# Patient Record
Sex: FEMALE | Race: BLACK OR AFRICAN AMERICAN | NOT HISPANIC OR LATINO | Employment: UNEMPLOYED | RURAL
[De-identification: names, ages, dates, MRNs, and addresses within clinical notes are randomized per-mention and may not be internally consistent; named-entity substitution may affect disease eponyms.]

---

## 2017-06-14 ENCOUNTER — HISTORICAL (OUTPATIENT)
Dept: ADMINISTRATIVE | Facility: HOSPITAL | Age: 57
End: 2017-06-14

## 2017-06-15 LAB
LAB AP CLINICAL INFORMATION: NORMAL
LAB AP DIAGNOSIS - HISTORICAL: NORMAL
LAB AP GROSS PATHOLOGY - HISTORICAL: NORMAL
LAB AP SPECIMEN SUBMITTED - HISTORICAL: NORMAL

## 2017-08-07 ENCOUNTER — HISTORICAL (OUTPATIENT)
Dept: ADMINISTRATIVE | Facility: HOSPITAL | Age: 57
End: 2017-08-07

## 2017-08-09 LAB
LAB AP COMMENTS: NORMAL
LAB AP GENERAL CAT - HISTORICAL: NORMAL
LAB AP INTERPRETATION/RESULT - HISTORICAL: NEGATIVE
LAB AP SPECIMEN ADEQUACY - HISTORICAL: NORMAL
LAB AP SPECIMEN SUBMITTED - HISTORICAL: NORMAL

## 2018-04-25 ENCOUNTER — HISTORICAL (OUTPATIENT)
Dept: ADMINISTRATIVE | Facility: HOSPITAL | Age: 58
End: 2018-04-25

## 2020-11-17 ENCOUNTER — HISTORICAL (OUTPATIENT)
Dept: ADMINISTRATIVE | Facility: HOSPITAL | Age: 60
End: 2020-11-17

## 2020-11-24 ENCOUNTER — HISTORICAL (OUTPATIENT)
Dept: ADMINISTRATIVE | Facility: HOSPITAL | Age: 60
End: 2020-11-24

## 2020-12-02 ENCOUNTER — HISTORICAL (OUTPATIENT)
Dept: ADMINISTRATIVE | Facility: HOSPITAL | Age: 60
End: 2020-12-02

## 2020-12-02 LAB — GLUCOSE SERPL-MCNC: 92 MG/DL (ref 70–105)

## 2020-12-06 ENCOUNTER — HISTORICAL (OUTPATIENT)
Dept: ADMINISTRATIVE | Facility: HOSPITAL | Age: 60
End: 2020-12-06

## 2020-12-06 LAB
ANION GAP SERPL CALCULATED.3IONS-SCNC: 14 MMOL/L
BASOPHILS # BLD AUTO: 0.07 X10E3/UL (ref 0–0.2)
BASOPHILS NFR BLD AUTO: 0.7 % (ref 0–1)
BILIRUB UR QL STRIP: NEGATIVE MG/DL
BUN SERPL-MCNC: 21 MG/DL (ref 7–18)
CALCIUM SERPL-MCNC: 9.4 MG/DL (ref 8.5–10.1)
CHLORIDE SERPL-SCNC: 102 MMOL/L (ref 98–107)
CLARITY UR: CLEAR
CO2 SERPL-SCNC: 23 MMOL/L (ref 21–32)
COLOR UR: ABNORMAL
CREAT SERPL-MCNC: 0.98 MG/DL (ref 0.55–1.02)
CRP SERPL-MCNC: 0.61 MG/DL (ref 0–0.8)
EOSINOPHIL # BLD AUTO: 0.07 X10E3/UL (ref 0–0.5)
EOSINOPHIL NFR BLD AUTO: 0.7 % (ref 1–4)
ERYTHROCYTE [DISTWIDTH] IN BLOOD BY AUTOMATED COUNT: 12.9 % (ref 11.5–14.5)
ERYTHROCYTE [SEDIMENTATION RATE] IN BLOOD BY WESTERGREN METHOD: 34 MM/HR (ref 0–30)
GLUCOSE SERPL-MCNC: 80 MG/DL (ref 74–106)
GLUCOSE UR STRIP-MCNC: NEGATIVE MG/DL
HCT VFR BLD AUTO: 40.6 % (ref 38–47)
HGB BLD-MCNC: 12.6 G/DL (ref 12–16)
IMM GRANULOCYTES # BLD AUTO: 0.04 X10E3/UL (ref 0–0.04)
IMM GRANULOCYTES NFR BLD: 0.4 % (ref 0–0.4)
KETONES UR STRIP-SCNC: NEGATIVE MG/DL
LEUKOCYTE ESTERASE UR QL STRIP: NEGATIVE LEU/UL
LYMPHOCYTES # BLD AUTO: 2.63 X10E3/UL (ref 1–4.8)
LYMPHOCYTES NFR BLD AUTO: 25.2 % (ref 27–41)
MCH RBC QN AUTO: 30.1 PG (ref 27–31)
MCHC RBC AUTO-ENTMCNC: 31 G/DL (ref 32–36)
MCV RBC AUTO: 96.9 FL (ref 80–96)
MONOCYTES # BLD AUTO: 0.59 X10E3/UL (ref 0–0.8)
MONOCYTES NFR BLD AUTO: 5.7 % (ref 2–6)
MPC BLD CALC-MCNC: 10.9 FL (ref 9.4–12.4)
NEUTROPHILS # BLD AUTO: 7.04 X10E3/UL (ref 1.8–7.7)
NEUTROPHILS NFR BLD AUTO: 67.3 % (ref 53–65)
NITRITE UR QL STRIP: NEGATIVE
NRBC # BLD AUTO: 0 X10E3/UL (ref 0–0)
NRBC, AUTO (.00): 0 /100 (ref 0–0)
PH UR STRIP: 5.5 PH UNITS (ref 5–8)
PLATELET # BLD AUTO: 197 X10E3/UL (ref 150–400)
POTASSIUM SERPL-SCNC: 4.2 MMOL/L (ref 3.5–5.1)
PROT UR QL STRIP: NEGATIVE MG/DL
RBC # BLD AUTO: 4.19 X10E6/UL (ref 4.2–5.4)
RBC # UR STRIP: NEGATIVE ERY/UL
SODIUM SERPL-SCNC: 135 MMOL/L (ref 136–145)
SP GR UR STRIP: 1.02 (ref 1–1.03)
UROBILINOGEN UR STRIP-ACNC: 0.2 EU/DL
WBC # BLD AUTO: 10.44 X10E3/UL (ref 4.5–11)

## 2020-12-07 ENCOUNTER — HISTORICAL (OUTPATIENT)
Dept: ADMINISTRATIVE | Facility: HOSPITAL | Age: 60
End: 2020-12-07

## 2020-12-07 LAB
ANION GAP SERPL CALCULATED.3IONS-SCNC: 8 MMOL/L
ANION GAP SERPL CALCULATED.3IONS-SCNC: 9 MMOL/L
BASOPHILS # BLD AUTO: 0.06 X10E3/UL (ref 0–0.2)
BASOPHILS # BLD AUTO: 0.07 X10E3/UL (ref 0–0.2)
BASOPHILS NFR BLD AUTO: 0.5 % (ref 0–1)
BASOPHILS NFR BLD AUTO: 0.6 % (ref 0–1)
BUN SERPL-MCNC: 22 MG/DL (ref 7–18)
BUN SERPL-MCNC: 23 MG/DL (ref 7–18)
CALCIUM SERPL-MCNC: 9.1 MG/DL (ref 8.5–10.1)
CALCIUM SERPL-MCNC: 9.3 MG/DL (ref 8.5–10.1)
CHLORIDE SERPL-SCNC: 102 MMOL/L (ref 98–107)
CHLORIDE SERPL-SCNC: 102 MMOL/L (ref 98–107)
CO2 SERPL-SCNC: 27 MMOL/L (ref 21–32)
CO2 SERPL-SCNC: 28 MMOL/L (ref 21–32)
CREAT SERPL-MCNC: 0.8 MG/DL (ref 0.55–1.02)
CREAT SERPL-MCNC: 0.82 MG/DL (ref 0.55–1.02)
EOSINOPHIL # BLD AUTO: 0.03 X10E3/UL (ref 0–0.5)
EOSINOPHIL # BLD AUTO: 0.04 X10E3/UL (ref 0–0.5)
EOSINOPHIL NFR BLD AUTO: 0.3 % (ref 1–4)
EOSINOPHIL NFR BLD AUTO: 0.3 % (ref 1–4)
ERYTHROCYTE [DISTWIDTH] IN BLOOD BY AUTOMATED COUNT: 12.7 % (ref 11.5–14.5)
ERYTHROCYTE [DISTWIDTH] IN BLOOD BY AUTOMATED COUNT: 12.9 % (ref 11.5–14.5)
GLUCOSE SERPL-MCNC: 207 MG/DL (ref 70–105)
GLUCOSE SERPL-MCNC: 79 MG/DL (ref 74–106)
GLUCOSE SERPL-MCNC: 86 MG/DL (ref 74–106)
HCT VFR BLD AUTO: 34.2 % (ref 38–47)
HCT VFR BLD AUTO: 34.3 % (ref 38–47)
HGB BLD-MCNC: 10.8 G/DL (ref 12–16)
HGB BLD-MCNC: 10.9 G/DL (ref 12–16)
IMM GRANULOCYTES # BLD AUTO: 0.05 X10E3/UL (ref 0–0.04)
IMM GRANULOCYTES # BLD AUTO: 0.06 X10E3/UL (ref 0–0.04)
IMM GRANULOCYTES NFR BLD: 0.4 % (ref 0–0.4)
IMM GRANULOCYTES NFR BLD: 0.5 % (ref 0–0.4)
LYMPHOCYTES # BLD AUTO: 3.19 X10E3/UL (ref 1–4.8)
LYMPHOCYTES # BLD AUTO: 3.35 X10E3/UL (ref 1–4.8)
LYMPHOCYTES NFR BLD AUTO: 27.4 % (ref 27–41)
LYMPHOCYTES NFR BLD AUTO: 28.1 % (ref 27–41)
MCH RBC QN AUTO: 29.8 PG (ref 27–31)
MCH RBC QN AUTO: 30 PG (ref 27–31)
MCHC RBC AUTO-ENTMCNC: 31.5 G/DL (ref 32–36)
MCHC RBC AUTO-ENTMCNC: 31.9 G/DL (ref 32–36)
MCV RBC AUTO: 93.4 FL (ref 80–96)
MCV RBC AUTO: 95.3 FL (ref 80–96)
MONOCYTES # BLD AUTO: 0.58 X10E3/UL (ref 0–0.8)
MONOCYTES # BLD AUTO: 0.64 X10E3/UL (ref 0–0.8)
MONOCYTES NFR BLD AUTO: 5 % (ref 2–6)
MONOCYTES NFR BLD AUTO: 5.4 % (ref 2–6)
MPC BLD CALC-MCNC: 10.8 FL (ref 9.4–12.4)
MPC BLD CALC-MCNC: 11.1 FL (ref 9.4–12.4)
NEUTROPHILS # BLD AUTO: 7.72 X10E3/UL (ref 1.8–7.7)
NEUTROPHILS # BLD AUTO: 7.76 X10E3/UL (ref 1.8–7.7)
NEUTROPHILS NFR BLD AUTO: 65.1 % (ref 53–65)
NEUTROPHILS NFR BLD AUTO: 66.4 % (ref 53–65)
NRBC # BLD AUTO: 0 X10E3/UL (ref 0–0)
NRBC # BLD AUTO: 0 X10E3/UL (ref 0–0)
NRBC, AUTO (.00): 0 /100 (ref 0–0)
NRBC, AUTO (.00): 0 /100 (ref 0–0)
PLATELET # BLD AUTO: 113 X10E3/UL (ref 150–400)
PLATELET # BLD AUTO: 188 X10E3/UL (ref 150–400)
PLATELET MORPHOLOGY: ABNORMAL
POTASSIUM SERPL-SCNC: 3.4 MMOL/L (ref 3.5–5.1)
POTASSIUM SERPL-SCNC: 3.5 MMOL/L (ref 3.5–5.1)
RBC # BLD AUTO: 3.6 X10E6/UL (ref 4.2–5.4)
RBC # BLD AUTO: 3.66 X10E6/UL (ref 4.2–5.4)
RBC MORPH BLD: NORMAL
SODIUM SERPL-SCNC: 134 MMOL/L (ref 136–145)
SODIUM SERPL-SCNC: 135 MMOL/L (ref 136–145)
TROPONIN I SERPL-MCNC: 0.02 NG/ML (ref 0–0.06)
TROPONIN I SERPL-MCNC: 0.02 NG/ML (ref 0–0.06)
WBC # BLD AUTO: 11.63 X10E3/UL (ref 4.5–11)
WBC # BLD AUTO: 11.92 X10E3/UL (ref 4.5–11)

## 2020-12-08 ENCOUNTER — HISTORICAL (OUTPATIENT)
Dept: ADMINISTRATIVE | Facility: HOSPITAL | Age: 60
End: 2020-12-08

## 2020-12-08 LAB
ANION GAP SERPL CALCULATED.3IONS-SCNC: 8 MMOL/L
BASOPHILS # BLD AUTO: 0.08 X10E3/UL (ref 0–0.2)
BASOPHILS NFR BLD AUTO: 0.7 % (ref 0–1)
BILIRUB UR QL STRIP: NEGATIVE MG/DL
BUN SERPL-MCNC: 25 MG/DL (ref 7–18)
CALCIUM SERPL-MCNC: 8.6 MG/DL (ref 8.5–10.1)
CHLORIDE SERPL-SCNC: 104 MMOL/L (ref 98–107)
CLARITY UR: CLEAR
CO2 SERPL-SCNC: 28 MMOL/L (ref 21–32)
COLOR UR: ABNORMAL
CREAT SERPL-MCNC: 0.89 MG/DL (ref 0.55–1.02)
EOSINOPHIL # BLD AUTO: 0.08 X10E3/UL (ref 0–0.5)
EOSINOPHIL NFR BLD AUTO: 0.7 % (ref 1–4)
ERYTHROCYTE [DISTWIDTH] IN BLOOD BY AUTOMATED COUNT: 12.8 % (ref 11.5–14.5)
GLUCOSE SERPL-MCNC: 111 MG/DL (ref 70–105)
GLUCOSE SERPL-MCNC: 156 MG/DL (ref 70–105)
GLUCOSE SERPL-MCNC: 89 MG/DL (ref 70–105)
GLUCOSE SERPL-MCNC: 89 MG/DL (ref 74–106)
GLUCOSE SERPL-MCNC: 97 MG/DL (ref 70–105)
GLUCOSE UR STRIP-MCNC: NEGATIVE MG/DL
HCT VFR BLD AUTO: 33.3 % (ref 38–47)
HGB BLD-MCNC: 10.3 G/DL (ref 12–16)
IMM GRANULOCYTES # BLD AUTO: 0.05 X10E3/UL (ref 0–0.04)
IMM GRANULOCYTES NFR BLD: 0.5 % (ref 0–0.4)
KETONES UR STRIP-SCNC: NEGATIVE MG/DL
LEUKOCYTE ESTERASE UR QL STRIP: NEGATIVE LEU/UL
LYMPHOCYTES # BLD AUTO: 3.26 X10E3/UL (ref 1–4.8)
LYMPHOCYTES NFR BLD AUTO: 30.5 % (ref 27–41)
MCH RBC QN AUTO: 29.5 PG (ref 27–31)
MCHC RBC AUTO-ENTMCNC: 30.9 G/DL (ref 32–36)
MCV RBC AUTO: 95.4 FL (ref 80–96)
MONOCYTES # BLD AUTO: 0.59 X10E3/UL (ref 0–0.8)
MONOCYTES NFR BLD AUTO: 5.5 % (ref 2–6)
MPC BLD CALC-MCNC: 11.3 FL (ref 9.4–12.4)
NEUTROPHILS # BLD AUTO: 6.64 X10E3/UL (ref 1.8–7.7)
NEUTROPHILS NFR BLD AUTO: 62.1 % (ref 53–65)
NITRITE UR QL STRIP: NEGATIVE
NRBC # BLD AUTO: 0 X10E3/UL (ref 0–0)
NRBC, AUTO (.00): 0 /100 (ref 0–0)
NT-PROBNP SERPL-MCNC: 238 PG/ML (ref 1–125)
PH UR STRIP: 5.5 PH UNITS (ref 5–8)
PLATELET # BLD AUTO: 193 X10E3/UL (ref 150–400)
POTASSIUM SERPL-SCNC: 4.2 MMOL/L (ref 3.5–5.1)
PROT UR QL STRIP: NEGATIVE MG/DL
RBC # BLD AUTO: 3.49 X10E6/UL (ref 4.2–5.4)
RBC # UR STRIP: NEGATIVE ERY/UL
SODIUM SERPL-SCNC: 136 MMOL/L (ref 136–145)
SP GR UR STRIP: >=1.03 (ref 1–1.03)
TROPONIN I SERPL-MCNC: 0.02 NG/ML (ref 0–0.06)
UROBILINOGEN UR STRIP-ACNC: 0.2 EU/DL
WBC # BLD AUTO: 10.7 X10E3/UL (ref 4.5–11)

## 2020-12-09 ENCOUNTER — HISTORICAL (OUTPATIENT)
Dept: ADMINISTRATIVE | Facility: HOSPITAL | Age: 60
End: 2020-12-09

## 2020-12-09 LAB
ANION GAP SERPL CALCULATED.3IONS-SCNC: 14 MMOL/L
BASOPHILS # BLD AUTO: 0.02 X10E3/UL (ref 0–0.2)
BASOPHILS NFR BLD AUTO: 0.2 % (ref 0–1)
BUN SERPL-MCNC: 26 MG/DL (ref 7–18)
CALCIUM SERPL-MCNC: 9.5 MG/DL (ref 8.5–10.1)
CHLORIDE SERPL-SCNC: 99 MMOL/L (ref 98–107)
CO2 SERPL-SCNC: 27 MMOL/L (ref 21–32)
CREAT SERPL-MCNC: 1.07 MG/DL (ref 0.55–1.02)
EOSINOPHIL # BLD AUTO: 0.01 X10E3/UL (ref 0–0.5)
EOSINOPHIL NFR BLD AUTO: 0.1 % (ref 1–4)
ERYTHROCYTE [DISTWIDTH] IN BLOOD BY AUTOMATED COUNT: 12.6 % (ref 11.5–14.5)
GLUCOSE SERPL-MCNC: 151 MG/DL (ref 74–106)
GLUCOSE SERPL-MCNC: 165 MG/DL (ref 70–105)
GLUCOSE SERPL-MCNC: 218 MG/DL (ref 70–105)
HCT VFR BLD AUTO: 36.5 % (ref 38–47)
HGB BLD-MCNC: 11.8 G/DL (ref 12–16)
IMM GRANULOCYTES # BLD AUTO: 0.06 X10E3/UL (ref 0–0.04)
IMM GRANULOCYTES NFR BLD: 0.6 % (ref 0–0.4)
INR BLD: 1.08 (ref 0–3.3)
LYMPHOCYTES # BLD AUTO: 1.01 X10E3/UL (ref 1–4.8)
LYMPHOCYTES NFR BLD AUTO: 10 % (ref 27–41)
MCH RBC QN AUTO: 30 PG (ref 27–31)
MCHC RBC AUTO-ENTMCNC: 32.3 G/DL (ref 32–36)
MCV RBC AUTO: 92.9 FL (ref 80–96)
MONOCYTES # BLD AUTO: 0.19 X10E3/UL (ref 0–0.8)
MONOCYTES NFR BLD AUTO: 1.9 % (ref 2–6)
MPC BLD CALC-MCNC: 10.8 FL (ref 9.4–12.4)
NEUTROPHILS # BLD AUTO: 8.76 X10E3/UL (ref 1.8–7.7)
NEUTROPHILS NFR BLD AUTO: 87.2 % (ref 53–65)
NRBC # BLD AUTO: 0 X10E3/UL (ref 0–0)
NRBC, AUTO (.00): 0 /100 (ref 0–0)
PLATELET # BLD AUTO: 194 X10E3/UL (ref 150–400)
POTASSIUM SERPL-SCNC: 4.7 MMOL/L (ref 3.5–5.1)
PROTHROMBIN TIME: 13.5 SECONDS (ref 11.7–14.7)
RBC # BLD AUTO: 3.93 X10E6/UL (ref 4.2–5.4)
SODIUM SERPL-SCNC: 135 MMOL/L (ref 136–145)
WBC # BLD AUTO: 10.05 X10E3/UL (ref 4.5–11)

## 2021-01-20 ENCOUNTER — HISTORICAL (OUTPATIENT)
Dept: ADMINISTRATIVE | Facility: HOSPITAL | Age: 61
End: 2021-01-20

## 2021-01-21 LAB
DSDNA IGG SERPL IA-ACNC: 104 IU (ref 0–24)
DSDNA IGG SERPL IA-ACNC: POSITIVE [IU]/ML
ENA AB SER QL IA: 19 EUS (ref 0–19.9)
ENA AB SER QL IA: NEGATIVE

## 2021-01-22 LAB — TTG IGA SER IA-ACNC: <1.2 U/ML

## 2021-01-25 ENCOUNTER — HISTORICAL (OUTPATIENT)
Dept: ADMINISTRATIVE | Facility: HOSPITAL | Age: 61
End: 2021-01-25

## 2021-01-25 LAB
ALBUMIN SERPL BCP-MCNC: 4 G/DL (ref 3.5–5)
ALP SERPL-CCNC: 120 U/L (ref 46–118)
ALT SERPL W P-5'-P-CCNC: 72 U/L (ref 13–56)
ANION GAP SERPL CALCULATED.3IONS-SCNC: 11 MMOL/L
APTT PPP: 39.9 SECONDS (ref 25.2–37.3)
AST SERPL W P-5'-P-CCNC: 34 U/L (ref 15–37)
BASOPHILS # BLD AUTO: 0.1 X10E3/UL (ref 0–0.2)
BASOPHILS NFR BLD AUTO: 1 % (ref 0–1)
BILIRUB DIRECT SERPL-MCNC: 0.1 MG/DL (ref 0–0.2)
BILIRUB SERPL-MCNC: 0.3 MG/DL (ref 0–1.2)
BILIRUB UR QL STRIP: NEGATIVE MG/DL
BUN SERPL-MCNC: 19 MG/DL (ref 7–18)
CALCIUM SERPL-MCNC: 9.1 MG/DL (ref 8.5–10.1)
CHLORIDE SERPL-SCNC: 103 MMOL/L (ref 98–107)
CLARITY UR: CLEAR
CO2 SERPL-SCNC: 31 MMOL/L (ref 21–32)
COLOR UR: ABNORMAL
CREAT SERPL-MCNC: 0.93 MG/DL (ref 0.55–1.02)
EOSINOPHIL # BLD AUTO: 0.12 X10E3/UL (ref 0–0.5)
EOSINOPHIL NFR BLD AUTO: 1.2 % (ref 1–4)
ERYTHROCYTE [DISTWIDTH] IN BLOOD BY AUTOMATED COUNT: 13.8 % (ref 11.5–14.5)
ERYTHROCYTE [SEDIMENTATION RATE] IN BLOOD BY WESTERGREN METHOD: 45 MM/HR (ref 0–30)
GLUCOSE SERPL-MCNC: 108 MG/DL (ref 70–105)
GLUCOSE SERPL-MCNC: 120 MG/DL (ref 74–106)
GLUCOSE SERPL-MCNC: 88 MG/DL (ref 70–105)
GLUCOSE SERPL-MCNC: 91 MG/DL (ref 70–105)
GLUCOSE UR STRIP-MCNC: NEGATIVE MG/DL
HCT VFR BLD AUTO: 37.1 % (ref 38–47)
HGB BLD-MCNC: 11.4 G/DL (ref 12–16)
IMM GRANULOCYTES # BLD AUTO: 0.06 X10E3/UL (ref 0–0.04)
IMM GRANULOCYTES NFR BLD: 0.6 % (ref 0–0.4)
INR BLD: 2.08 (ref 0–3.3)
KETONES UR STRIP-SCNC: NEGATIVE MG/DL
LEUKOCYTE ESTERASE UR QL STRIP: NEGATIVE LEU/UL
LYMPHOCYTES # BLD AUTO: 2.49 X10E3/UL (ref 1–4.8)
LYMPHOCYTES NFR BLD AUTO: 25 % (ref 27–41)
MCH RBC QN AUTO: 30.1 PG (ref 27–31)
MCHC RBC AUTO-ENTMCNC: 30.7 G/DL (ref 32–36)
MCV RBC AUTO: 97.9 FL (ref 80–96)
MONOCYTES # BLD AUTO: 0.52 X10E3/UL (ref 0–0.8)
MONOCYTES NFR BLD AUTO: 5.2 % (ref 2–6)
MPC BLD CALC-MCNC: 10.4 FL (ref 9.4–12.4)
NEUTROPHILS # BLD AUTO: 6.68 X10E3/UL (ref 1.8–7.7)
NEUTROPHILS NFR BLD AUTO: 67 % (ref 53–65)
NITRITE UR QL STRIP: NEGATIVE
NRBC # BLD AUTO: 0 X10E3/UL (ref 0–0)
NRBC, AUTO (.00): 0 /100 (ref 0–0)
PH UR STRIP: 6 PH UNITS (ref 5–8)
PLATELET # BLD AUTO: 213 X10E3/UL (ref 150–400)
POTASSIUM SERPL-SCNC: 4 MMOL/L (ref 3.5–5.1)
PROT SERPL-MCNC: 8.8 G/DL (ref 6.4–8.2)
PROT UR QL STRIP: NEGATIVE MG/DL
PROTHROMBIN TIME: 22.2 SECONDS (ref 11.7–14.7)
RBC # BLD AUTO: 3.79 X10E6/UL (ref 4.2–5.4)
RBC # UR STRIP: NEGATIVE ERY/UL
SODIUM SERPL-SCNC: 141 MMOL/L (ref 136–145)
SP GR UR STRIP: 1.01 (ref 1–1.03)
UROBILINOGEN UR STRIP-ACNC: 0.2 EU/DL
WBC # BLD AUTO: 9.97 X10E3/UL (ref 4.5–11)

## 2021-01-26 ENCOUNTER — HISTORICAL (OUTPATIENT)
Dept: ADMINISTRATIVE | Facility: HOSPITAL | Age: 61
End: 2021-01-26

## 2021-01-26 LAB
ANION GAP SERPL CALCULATED.3IONS-SCNC: 14 MMOL/L
APTT PPP: 63.4 SECONDS (ref 25.2–37.3)
BASOPHILS # BLD AUTO: 0.08 X10E3/UL (ref 0–0.2)
BASOPHILS NFR BLD AUTO: 0.9 % (ref 0–1)
BUN SERPL-MCNC: 22 MG/DL (ref 7–18)
CALCIUM SERPL-MCNC: 9.1 MG/DL (ref 8.5–10.1)
CHLORIDE SERPL-SCNC: 105 MMOL/L (ref 98–107)
CO2 SERPL-SCNC: 27 MMOL/L (ref 21–32)
CREAT SERPL-MCNC: 0.77 MG/DL (ref 0.55–1.02)
EOSINOPHIL # BLD AUTO: 0.1 X10E3/UL (ref 0–0.5)
EOSINOPHIL NFR BLD AUTO: 1.2 % (ref 1–4)
ERYTHROCYTE [DISTWIDTH] IN BLOOD BY AUTOMATED COUNT: 14.1 % (ref 11.5–14.5)
ERYTHROCYTE [SEDIMENTATION RATE] IN BLOOD BY WESTERGREN METHOD: 64 MM/HR (ref 0–30)
FERRITIN SERPL-MCNC: 290 NG/ML (ref 8–252)
GLUCOSE SERPL-MCNC: 152 MG/DL (ref 70–105)
GLUCOSE SERPL-MCNC: 175 MG/DL (ref 70–105)
GLUCOSE SERPL-MCNC: 78 MG/DL (ref 70–105)
GLUCOSE SERPL-MCNC: 88 MG/DL (ref 74–106)
HCT VFR BLD AUTO: 33.8 % (ref 38–47)
HGB BLD-MCNC: 10.4 G/DL (ref 12–16)
IMM GRANULOCYTES # BLD AUTO: 0.06 X10E3/UL (ref 0–0.04)
IMM GRANULOCYTES NFR BLD: 0.7 % (ref 0–0.4)
INR BLD: 1.75 (ref 0–3.3)
IRON SATN MFR SERPL: 22 % (ref 14–50)
IRON SERPL-MCNC: 62 UG/DL (ref 50–170)
LYMPHOCYTES # BLD AUTO: 2.67 X10E3/UL (ref 1–4.8)
LYMPHOCYTES NFR BLD AUTO: 31.2 % (ref 27–41)
MCH RBC QN AUTO: 30.2 PG (ref 27–31)
MCHC RBC AUTO-ENTMCNC: 30.8 G/DL (ref 32–36)
MCV RBC AUTO: 98.3 FL (ref 80–96)
MONOCYTES # BLD AUTO: 0.54 X10E3/UL (ref 0–0.8)
MONOCYTES NFR BLD AUTO: 6.3 % (ref 2–6)
MPC BLD CALC-MCNC: 10.9 FL (ref 9.4–12.4)
NEUTROPHILS # BLD AUTO: 5.11 X10E3/UL (ref 1.8–7.7)
NEUTROPHILS NFR BLD AUTO: 59.7 % (ref 53–65)
NRBC # BLD AUTO: 0 X10E3/UL (ref 0–0)
NRBC, AUTO (.00): 0 /100 (ref 0–0)
PLATELET # BLD AUTO: 211 X10E3/UL (ref 150–400)
POTASSIUM SERPL-SCNC: 4.5 MMOL/L (ref 3.5–5.1)
PROTHROMBIN TIME: 19.5 SECONDS (ref 11.7–14.7)
RBC # BLD AUTO: 3.44 X10E6/UL (ref 4.2–5.4)
SODIUM SERPL-SCNC: 141 MMOL/L (ref 136–145)
TIBC SERPL-MCNC: 284 UG/DL (ref 250–450)
WBC # BLD AUTO: 8.56 X10E3/UL (ref 4.5–11)

## 2021-01-27 ENCOUNTER — HISTORICAL (OUTPATIENT)
Dept: ADMINISTRATIVE | Facility: HOSPITAL | Age: 61
End: 2021-01-27

## 2021-01-27 LAB
ANION GAP SERPL CALCULATED.3IONS-SCNC: 14 MMOL/L
ANION GAP SERPL CALCULATED.3IONS-SCNC: 14 MMOL/L
APTT PPP: 33 SECONDS (ref 25.2–37.3)
BASOPHILS # BLD AUTO: 0.02 X10E3/UL (ref 0–0.2)
BASOPHILS NFR BLD AUTO: 0.2 % (ref 0–1)
BUN SERPL-MCNC: 20 MG/DL (ref 7–18)
BUN SERPL-MCNC: 20 MG/DL (ref 7–18)
CALCIUM SERPL-MCNC: 9 MG/DL (ref 8.5–10.1)
CALCIUM SERPL-MCNC: 9.2 MG/DL (ref 8.5–10.1)
CHLORIDE SERPL-SCNC: 104 MMOL/L (ref 98–107)
CHLORIDE SERPL-SCNC: 105 MMOL/L (ref 98–107)
CO2 SERPL-SCNC: 24 MMOL/L (ref 21–32)
CO2 SERPL-SCNC: 25 MMOL/L (ref 21–32)
CREAT SERPL-MCNC: 0.74 MG/DL (ref 0.55–1.02)
CREAT SERPL-MCNC: 0.88 MG/DL (ref 0.55–1.02)
EOSINOPHIL # BLD AUTO: 0.01 X10E3/UL (ref 0–0.5)
EOSINOPHIL NFR BLD AUTO: 0.1 % (ref 1–4)
ERYTHROCYTE [DISTWIDTH] IN BLOOD BY AUTOMATED COUNT: 13.6 % (ref 11.5–14.5)
GLUCOSE SERPL-MCNC: 112 MG/DL (ref 70–105)
GLUCOSE SERPL-MCNC: 141 MG/DL (ref 70–105)
GLUCOSE SERPL-MCNC: 170 MG/DL (ref 70–105)
GLUCOSE SERPL-MCNC: 185 MG/DL (ref 74–106)
GLUCOSE SERPL-MCNC: 49 MG/DL (ref 70–105)
GLUCOSE SERPL-MCNC: 57 MG/DL (ref 70–105)
GLUCOSE SERPL-MCNC: 67 MG/DL (ref 70–105)
GLUCOSE SERPL-MCNC: 69 MG/DL (ref 74–106)
GLUCOSE SERPL-MCNC: 78 MG/DL (ref 70–105)
GLUCOSE SERPL-MCNC: 85 MG/DL (ref 70–105)
HCT VFR BLD AUTO: 33.7 % (ref 38–47)
HGB BLD-MCNC: 10.5 G/DL (ref 12–16)
IMM GRANULOCYTES # BLD AUTO: 0.08 X10E3/UL (ref 0–0.04)
IMM GRANULOCYTES NFR BLD: 0.8 % (ref 0–0.4)
INR BLD: 1.15 (ref 0–3.3)
LYMPHOCYTES # BLD AUTO: 1.6 X10E3/UL (ref 1–4.8)
LYMPHOCYTES NFR BLD AUTO: 15.2 % (ref 27–41)
MCH RBC QN AUTO: 29.7 PG (ref 27–31)
MCHC RBC AUTO-ENTMCNC: 31.2 G/DL (ref 32–36)
MCV RBC AUTO: 95.2 FL (ref 80–96)
MONOCYTES # BLD AUTO: 0.48 X10E3/UL (ref 0–0.8)
MONOCYTES NFR BLD AUTO: 4.6 % (ref 2–6)
MPC BLD CALC-MCNC: 11.1 FL (ref 9.4–12.4)
NEUTROPHILS # BLD AUTO: 8.31 X10E3/UL (ref 1.8–7.7)
NEUTROPHILS NFR BLD AUTO: 79.1 % (ref 53–65)
NRBC # BLD AUTO: 0 X10E3/UL (ref 0–0)
NRBC, AUTO (.00): 0 /100 (ref 0–0)
PLATELET # BLD AUTO: 152 X10E3/UL (ref 150–400)
POTASSIUM SERPL-SCNC: 4 MMOL/L (ref 3.5–5.1)
POTASSIUM SERPL-SCNC: 4.8 MMOL/L (ref 3.5–5.1)
PROTHROMBIN TIME: 14.1 SECONDS (ref 11.7–14.7)
RBC # BLD AUTO: 3.54 X10E6/UL (ref 4.2–5.4)
SODIUM SERPL-SCNC: 138 MMOL/L (ref 136–145)
SODIUM SERPL-SCNC: 139 MMOL/L (ref 136–145)
WBC # BLD AUTO: 10.5 X10E3/UL (ref 4.5–11)

## 2021-01-28 ENCOUNTER — HISTORICAL (OUTPATIENT)
Dept: ADMINISTRATIVE | Facility: HOSPITAL | Age: 61
End: 2021-01-28

## 2021-01-28 LAB
ANION GAP SERPL CALCULATED.3IONS-SCNC: 12 MMOL/L
APTT PPP: 37.1 SECONDS (ref 25.2–37.3)
BASOPHILS # BLD AUTO: 0.05 X10E3/UL (ref 0–0.2)
BASOPHILS NFR BLD AUTO: 0.5 % (ref 0–1)
BUN SERPL-MCNC: 17 MG/DL (ref 7–18)
CALCIUM SERPL-MCNC: 8.1 MG/DL (ref 8.5–10.1)
CHLORIDE SERPL-SCNC: 109 MMOL/L (ref 98–107)
CO2 SERPL-SCNC: 26 MMOL/L (ref 21–32)
CREAT SERPL-MCNC: 0.81 MG/DL (ref 0.55–1.02)
EOSINOPHIL # BLD AUTO: 0.07 X10E3/UL (ref 0–0.5)
EOSINOPHIL NFR BLD AUTO: 0.7 % (ref 1–4)
ERYTHROCYTE [DISTWIDTH] IN BLOOD BY AUTOMATED COUNT: 14.1 % (ref 11.5–14.5)
GLUCOSE SERPL-MCNC: 115 MG/DL (ref 70–105)
GLUCOSE SERPL-MCNC: 127 MG/DL (ref 70–105)
GLUCOSE SERPL-MCNC: 64 MG/DL (ref 70–105)
GLUCOSE SERPL-MCNC: 72 MG/DL (ref 70–105)
GLUCOSE SERPL-MCNC: 74 MG/DL (ref 70–105)
GLUCOSE SERPL-MCNC: 78 MG/DL (ref 74–106)
HCT VFR BLD AUTO: 31 % (ref 38–47)
HGB BLD-MCNC: 9.6 G/DL (ref 12–16)
IMM GRANULOCYTES # BLD AUTO: 0.06 X10E3/UL (ref 0–0.04)
IMM GRANULOCYTES NFR BLD: 0.6 % (ref 0–0.4)
INR BLD: 1.1 (ref 0–3.3)
INR BLD: 1.1 (ref 0–3.3)
LYMPHOCYTES # BLD AUTO: 2.72 X10E3/UL (ref 1–4.8)
LYMPHOCYTES NFR BLD AUTO: 27.8 % (ref 27–41)
MCH RBC QN AUTO: 30.3 PG (ref 27–31)
MCHC RBC AUTO-ENTMCNC: 31 G/DL (ref 32–36)
MCV RBC AUTO: 97.8 FL (ref 80–96)
MONOCYTES # BLD AUTO: 0.68 X10E3/UL (ref 0–0.8)
MONOCYTES NFR BLD AUTO: 6.9 % (ref 2–6)
MPC BLD CALC-MCNC: 10.7 FL (ref 9.4–12.4)
NEUTROPHILS # BLD AUTO: 6.21 X10E3/UL (ref 1.8–7.7)
NEUTROPHILS NFR BLD AUTO: 63.5 % (ref 53–65)
NRBC # BLD AUTO: 0 X10E3/UL (ref 0–0)
NRBC, AUTO (.00): 0 /100 (ref 0–0)
PLATELET # BLD AUTO: 186 X10E3/UL (ref 150–400)
POTASSIUM SERPL-SCNC: 4.2 MMOL/L (ref 3.5–5.1)
PROTHROMBIN TIME: 13.7 SECONDS (ref 11.7–14.7)
PROTHROMBIN TIME: 13.7 SECONDS (ref 11.7–14.7)
RBC # BLD AUTO: 3.17 X10E6/UL (ref 4.2–5.4)
SODIUM SERPL-SCNC: 143 MMOL/L (ref 136–145)
WBC # BLD AUTO: 9.79 X10E3/UL (ref 4.5–11)

## 2021-01-29 ENCOUNTER — HISTORICAL (OUTPATIENT)
Dept: ADMINISTRATIVE | Facility: HOSPITAL | Age: 61
End: 2021-01-29

## 2021-01-29 LAB
ANION GAP SERPL CALCULATED.3IONS-SCNC: 12 MMOL/L
BASOPHILS # BLD AUTO: 0.02 X10E3/UL (ref 0–0.2)
BASOPHILS NFR BLD AUTO: 0.3 % (ref 0–1)
BUN SERPL-MCNC: 10 MG/DL (ref 7–18)
CALCIUM SERPL-MCNC: 8.8 MG/DL (ref 8.5–10.1)
CHLORIDE SERPL-SCNC: 109 MMOL/L (ref 98–107)
CO2 SERPL-SCNC: 27 MMOL/L (ref 21–32)
CREAT SERPL-MCNC: 0.65 MG/DL (ref 0.55–1.02)
EOSINOPHIL # BLD AUTO: 0.01 X10E3/UL (ref 0–0.5)
EOSINOPHIL NFR BLD AUTO: 0.1 % (ref 1–4)
ERYTHROCYTE [DISTWIDTH] IN BLOOD BY AUTOMATED COUNT: 14 % (ref 11.5–14.5)
GLUCOSE SERPL-MCNC: 65 MG/DL (ref 74–106)
GLUCOSE SERPL-MCNC: 89 MG/DL (ref 70–105)
HCT VFR BLD AUTO: 28.3 % (ref 38–47)
HGB BLD-MCNC: 8.9 G/DL (ref 12–16)
IMM GRANULOCYTES # BLD AUTO: 0.04 X10E3/UL (ref 0–0.04)
IMM GRANULOCYTES NFR BLD: 0.6 % (ref 0–0.4)
LYMPHOCYTES # BLD AUTO: 1.55 X10E3/UL (ref 1–4.8)
LYMPHOCYTES NFR BLD AUTO: 21.3 % (ref 27–41)
MCH RBC QN AUTO: 30.4 PG (ref 27–31)
MCHC RBC AUTO-ENTMCNC: 31.4 G/DL (ref 32–36)
MCV RBC AUTO: 96.6 FL (ref 80–96)
MONOCYTES # BLD AUTO: 0.53 X10E3/UL (ref 0–0.8)
MONOCYTES NFR BLD AUTO: 7.3 % (ref 2–6)
MPC BLD CALC-MCNC: 11.5 FL (ref 9.4–12.4)
NEUTROPHILS # BLD AUTO: 5.11 X10E3/UL (ref 1.8–7.7)
NEUTROPHILS NFR BLD AUTO: 70.4 % (ref 53–65)
NRBC # BLD AUTO: 0 X10E3/UL (ref 0–0)
NRBC, AUTO (.00): 0 /100 (ref 0–0)
PLATELET # BLD AUTO: 153 X10E3/UL (ref 150–400)
POTASSIUM SERPL-SCNC: 3.7 MMOL/L (ref 3.5–5.1)
RBC # BLD AUTO: 2.93 X10E6/UL (ref 4.2–5.4)
SODIUM SERPL-SCNC: 144 MMOL/L (ref 136–145)
WBC # BLD AUTO: 7.26 X10E3/UL (ref 4.5–11)

## 2021-02-01 LAB
GLUCOSE SERPL-MCNC: 121 MG/DL (ref 70–105)
GLUCOSE SERPL-MCNC: 55 MG/DL (ref 70–105)

## 2021-05-04 ENCOUNTER — TELEPHONE (OUTPATIENT)
Dept: GASTROENTEROLOGY | Facility: CLINIC | Age: 61
End: 2021-05-04

## 2021-06-03 RX ORDER — HYDROCHLOROTHIAZIDE 12.5 MG/1
12.5 TABLET ORAL DAILY
COMMUNITY

## 2021-06-03 RX ORDER — OXYCODONE AND ACETAMINOPHEN 10; 325 MG/1; MG/1
1 TABLET ORAL EVERY 6 HOURS
COMMUNITY
End: 2021-06-07 | Stop reason: SDUPTHER

## 2021-06-03 RX ORDER — LISINOPRIL 10 MG/1
10 TABLET ORAL DAILY
COMMUNITY

## 2021-06-03 RX ORDER — METFORMIN HYDROCHLORIDE 500 MG/1
500 TABLET ORAL 2 TIMES DAILY WITH MEALS
COMMUNITY

## 2021-06-03 RX ORDER — FUROSEMIDE 20 MG/1
20 TABLET ORAL 2 TIMES DAILY
COMMUNITY
End: 2021-10-06 | Stop reason: DRUGHIGH

## 2021-06-03 RX ORDER — GABAPENTIN 300 MG/1
300 CAPSULE ORAL 3 TIMES DAILY
COMMUNITY

## 2021-06-03 RX ORDER — AMITRIPTYLINE HYDROCHLORIDE 50 MG/1
50 TABLET, FILM COATED ORAL NIGHTLY
COMMUNITY
End: 2023-06-01 | Stop reason: SDUPTHER

## 2021-06-07 ENCOUNTER — OFFICE VISIT (OUTPATIENT)
Dept: PAIN MEDICINE | Facility: CLINIC | Age: 61
End: 2021-06-07
Payer: MEDICAID

## 2021-06-07 VITALS
HEART RATE: 89 BPM | WEIGHT: 164 LBS | BODY MASS INDEX: 30.96 KG/M2 | DIASTOLIC BLOOD PRESSURE: 72 MMHG | SYSTOLIC BLOOD PRESSURE: 137 MMHG | HEIGHT: 61 IN

## 2021-06-07 DIAGNOSIS — Z79.01 ENCOUNTER FOR CURRENT LONG-TERM USE OF ANTICOAGULANTS: ICD-10-CM

## 2021-06-07 DIAGNOSIS — M54.16 LUMBAR RADICULOPATHY: Chronic | ICD-10-CM

## 2021-06-07 DIAGNOSIS — Z79.899 OTHER LONG TERM (CURRENT) DRUG THERAPY: Primary | ICD-10-CM

## 2021-06-07 DIAGNOSIS — G89.4 CHRONIC PAIN DISORDER: Chronic | ICD-10-CM

## 2021-06-07 DIAGNOSIS — M06.9 RHEUMATOID ARTHRITIS, INVOLVING UNSPECIFIED SITE, UNSPECIFIED WHETHER RHEUMATOID FACTOR PRESENT: Chronic | ICD-10-CM

## 2021-06-07 LAB
CTP QC/QA: YES
POC (AMP) AMPHETAMINE: NEGATIVE
POC (BAR) BARBITURATES: NEGATIVE
POC (BUP) BUPRENORPHINE: NEGATIVE
POC (BZO) BENZODIAZEPINES: NEGATIVE
POC (COC) COCAINE: NEGATIVE
POC (MDMA) METHYLENEDIOXYMETHAMPHETAMINE 3,4: NEGATIVE
POC (MET) METHAMPHETAMINE: NEGATIVE
POC (MOP) OPIATES: NEGATIVE
POC (MTD) METHADONE: NEGATIVE
POC (OXY) OXYCODONE: ABNORMAL
POC (PCP) PHENCYCLIDINE: NEGATIVE
POC (TCA) TRICYCLIC ANTIDEPRESSANTS: NEGATIVE
POC TEMPERATURE (URINE): 94
POC THC: NEGATIVE

## 2021-06-07 PROCEDURE — 99214 PR OFFICE/OUTPT VISIT, EST, LEVL IV, 30-39 MIN: ICD-10-PCS | Mod: S$PBB,,, | Performed by: PAIN MEDICINE

## 2021-06-07 PROCEDURE — 99214 OFFICE O/P EST MOD 30 MIN: CPT | Mod: S$PBB,,, | Performed by: PAIN MEDICINE

## 2021-06-07 PROCEDURE — 80305 DRUG TEST PRSMV DIR OPT OBS: CPT | Mod: PBBFAC | Performed by: PAIN MEDICINE

## 2021-06-07 PROCEDURE — 99215 OFFICE O/P EST HI 40 MIN: CPT | Mod: PBBFAC | Performed by: PAIN MEDICINE

## 2021-06-07 RX ORDER — OXYCODONE AND ACETAMINOPHEN 10; 325 MG/1; MG/1
1 TABLET ORAL EVERY 6 HOURS PRN
Qty: 120 TABLET | Refills: 0 | Status: SHIPPED | OUTPATIENT
Start: 2021-06-07 | End: 2021-07-07

## 2021-06-07 RX ORDER — WARFARIN 7.5 MG/1
7.5 TABLET ORAL DAILY
COMMUNITY
End: 2021-10-06 | Stop reason: SDUPTHER

## 2021-06-07 RX ORDER — CELECOXIB 200 MG/1
200 CAPSULE ORAL DAILY
Qty: 30 CAPSULE | Refills: 0 | Status: SHIPPED | OUTPATIENT
Start: 2021-06-07 | End: 2021-07-07

## 2021-06-22 ENCOUNTER — TELEPHONE (OUTPATIENT)
Dept: PAIN MEDICINE | Facility: CLINIC | Age: 61
End: 2021-06-22

## 2021-06-22 DIAGNOSIS — Z79.01 ENCOUNTER FOR CURRENT LONG-TERM USE OF ANTICOAGULANTS: Primary | ICD-10-CM

## 2021-06-30 ENCOUNTER — TELEPHONE (OUTPATIENT)
Dept: PAIN MEDICINE | Facility: CLINIC | Age: 61
End: 2021-06-30

## 2021-07-08 ENCOUNTER — HOSPITAL ENCOUNTER (OUTPATIENT)
Facility: HOSPITAL | Age: 61
Discharge: HOME | End: 2021-07-08
Attending: PAIN MEDICINE | Admitting: PAIN MEDICINE
Payer: MEDICAID

## 2021-07-08 ENCOUNTER — ANESTHESIA (OUTPATIENT)
Dept: PAIN MEDICINE | Facility: HOSPITAL | Age: 61
End: 2021-07-08
Payer: MEDICAID

## 2021-07-08 ENCOUNTER — ANESTHESIA EVENT (OUTPATIENT)
Dept: PAIN MEDICINE | Facility: HOSPITAL | Age: 61
End: 2021-07-08
Payer: MEDICAID

## 2021-07-08 VITALS
OXYGEN SATURATION: 98 % | DIASTOLIC BLOOD PRESSURE: 63 MMHG | HEART RATE: 68 BPM | SYSTOLIC BLOOD PRESSURE: 132 MMHG | HEIGHT: 61 IN | TEMPERATURE: 98 F | BODY MASS INDEX: 31.08 KG/M2 | WEIGHT: 164.63 LBS | RESPIRATION RATE: 17 BRPM

## 2021-07-08 DIAGNOSIS — M54.16 LUMBAR RADICULOPATHY: Primary | ICD-10-CM

## 2021-07-08 LAB
GLUCOSE SERPL-MCNC: 82 MG/DL (ref 70–105)
GLUCOSE SERPL-MCNC: 82 MG/DL (ref 70–110)

## 2021-07-08 PROCEDURE — 25000003 PHARM REV CODE 250: Performed by: PAIN MEDICINE

## 2021-07-08 PROCEDURE — 64484 NJX AA&/STRD TFRM EPI L/S EA: CPT | Mod: RT,,, | Performed by: PAIN MEDICINE

## 2021-07-08 PROCEDURE — D9220A PRA ANESTHESIA: Mod: ,,, | Performed by: NURSE ANESTHETIST, CERTIFIED REGISTERED

## 2021-07-08 PROCEDURE — 64483 NJX AA&/STRD TFRM EPI L/S 1: CPT | Performed by: PAIN MEDICINE

## 2021-07-08 PROCEDURE — 64484 NJX AA&/STRD TFRM EPI L/S EA: CPT | Mod: 59 | Performed by: PAIN MEDICINE

## 2021-07-08 PROCEDURE — 25000003 PHARM REV CODE 250: Performed by: NURSE ANESTHETIST, CERTIFIED REGISTERED

## 2021-07-08 PROCEDURE — 82962 GLUCOSE BLOOD TEST: CPT

## 2021-07-08 PROCEDURE — 37000008 HC ANESTHESIA 1ST 15 MINUTES: Performed by: PAIN MEDICINE

## 2021-07-08 PROCEDURE — 63600175 PHARM REV CODE 636 W HCPCS: Performed by: NURSE ANESTHETIST, CERTIFIED REGISTERED

## 2021-07-08 PROCEDURE — D9220A PRA ANESTHESIA: ICD-10-PCS | Mod: ,,, | Performed by: NURSE ANESTHETIST, CERTIFIED REGISTERED

## 2021-07-08 PROCEDURE — 64484 PRA INJECT ANES/STEROID FORAMEN LUMBAR/SACRAL W IMG GUIDE ,EA ADD LEVEL: ICD-10-PCS | Mod: RT,,, | Performed by: PAIN MEDICINE

## 2021-07-08 PROCEDURE — 64483 PR EPIDURAL INJ, ANES/STEROID, TRANSFORAMINAL, LUMB/SACR, SNGL LEVL: ICD-10-PCS | Mod: RT,,, | Performed by: PAIN MEDICINE

## 2021-07-08 PROCEDURE — 64483 NJX AA&/STRD TFRM EPI L/S 1: CPT | Mod: RT,,, | Performed by: PAIN MEDICINE

## 2021-07-08 PROCEDURE — 27201423 OPTIME MED/SURG SUP & DEVICES STERILE SUPPLY: Performed by: PAIN MEDICINE

## 2021-07-08 RX ORDER — OXYCODONE AND ACETAMINOPHEN 10; 325 MG/1; MG/1
1 TABLET ORAL EVERY 6 HOURS PRN
Qty: 120 TABLET | Refills: 0 | Status: SHIPPED | OUTPATIENT
Start: 2021-07-14 | End: 2021-08-13

## 2021-07-08 RX ORDER — SODIUM CHLORIDE 9 MG/ML
INJECTION, SOLUTION INTRAVENOUS CONTINUOUS
Status: DISCONTINUED | OUTPATIENT
Start: 2021-07-08 | End: 2021-07-08 | Stop reason: HOSPADM

## 2021-07-08 RX ORDER — PROPOFOL 10 MG/ML
VIAL (ML) INTRAVENOUS
Status: DISCONTINUED | OUTPATIENT
Start: 2021-07-08 | End: 2021-07-08

## 2021-07-08 RX ORDER — LIDOCAINE HYDROCHLORIDE 20 MG/ML
INJECTION, SOLUTION EPIDURAL; INFILTRATION; INTRACAUDAL; PERINEURAL
Status: DISCONTINUED | OUTPATIENT
Start: 2021-07-08 | End: 2021-07-08

## 2021-07-08 RX ADMIN — PROPOFOL 100 MG: 10 INJECTION, EMULSION INTRAVENOUS at 10:07

## 2021-07-08 RX ADMIN — SODIUM CHLORIDE: 9 INJECTION, SOLUTION INTRAVENOUS at 10:07

## 2021-07-08 RX ADMIN — LIDOCAINE HYDROCHLORIDE 100 MG: 20 INJECTION, SOLUTION INTRAVENOUS at 10:07

## 2021-07-08 RX ADMIN — PROPOFOL 30 MG: 10 INJECTION, EMULSION INTRAVENOUS at 10:07

## 2021-07-10 ENCOUNTER — HOSPITAL ENCOUNTER (EMERGENCY)
Facility: HOSPITAL | Age: 61
Discharge: HOME | End: 2021-07-10
Attending: FAMILY MEDICINE
Payer: MEDICAID

## 2021-07-10 VITALS
SYSTOLIC BLOOD PRESSURE: 120 MMHG | WEIGHT: 161 LBS | HEART RATE: 62 BPM | RESPIRATION RATE: 12 BRPM | OXYGEN SATURATION: 99 % | TEMPERATURE: 99 F | BODY MASS INDEX: 30.42 KG/M2 | DIASTOLIC BLOOD PRESSURE: 63 MMHG

## 2021-07-10 DIAGNOSIS — G89.4 CHRONIC PAIN SYNDROME: Primary | ICD-10-CM

## 2021-07-10 DIAGNOSIS — R07.9 CHEST PAIN: ICD-10-CM

## 2021-07-10 DIAGNOSIS — M54.16 LUMBAR RADICULOPATHY: ICD-10-CM

## 2021-07-10 DIAGNOSIS — M32.9 LUPUS: ICD-10-CM

## 2021-07-10 LAB
ALBUMIN SERPL BCP-MCNC: 3.3 G/DL (ref 3.5–5)
ALBUMIN/GLOB SERPL: 0.8 {RATIO}
ALP SERPL-CCNC: 139 U/L (ref 46–118)
ALT SERPL W P-5'-P-CCNC: 26 U/L (ref 13–56)
ANION GAP SERPL CALCULATED.3IONS-SCNC: 13 MMOL/L (ref 7–16)
AST SERPL W P-5'-P-CCNC: 15 U/L (ref 15–37)
BASOPHILS # BLD AUTO: 0.02 K/UL (ref 0–0.2)
BASOPHILS NFR BLD AUTO: 0.2 % (ref 0–1)
BILIRUB SERPL-MCNC: 0.2 MG/DL (ref 0–1.2)
BUN SERPL-MCNC: 21 MG/DL (ref 7–18)
BUN/CREAT SERPL: 21 (ref 6–20)
CALCIUM SERPL-MCNC: 8.7 MG/DL (ref 8.5–10.1)
CHLORIDE SERPL-SCNC: 110 MMOL/L (ref 98–107)
CO2 SERPL-SCNC: 24 MMOL/L (ref 21–32)
CREAT SERPL-MCNC: 1.02 MG/DL (ref 0.55–1.02)
DIFFERENTIAL METHOD BLD: ABNORMAL
EOSINOPHIL # BLD AUTO: 0 K/UL (ref 0–0.5)
EOSINOPHIL NFR BLD AUTO: 0 % (ref 1–4)
ERYTHROCYTE [DISTWIDTH] IN BLOOD BY AUTOMATED COUNT: 13.3 % (ref 11.5–14.5)
GLOBULIN SER-MCNC: 4.3 G/DL (ref 2–4)
GLUCOSE SERPL-MCNC: 92 MG/DL (ref 74–106)
HCT VFR BLD AUTO: 31.3 % (ref 38–47)
HGB BLD-MCNC: 9.9 G/DL (ref 12–16)
IMM GRANULOCYTES # BLD AUTO: 0.11 K/UL (ref 0–0.04)
IMM GRANULOCYTES NFR BLD: 1.2 % (ref 0–0.4)
LYMPHOCYTES # BLD AUTO: 1.52 K/UL (ref 1–4.8)
LYMPHOCYTES NFR BLD AUTO: 16.8 % (ref 27–41)
MCH RBC QN AUTO: 29.8 PG (ref 27–31)
MCHC RBC AUTO-ENTMCNC: 31.6 G/DL (ref 32–36)
MCV RBC AUTO: 94.3 FL (ref 80–96)
MONOCYTES # BLD AUTO: 0.49 K/UL (ref 0–0.8)
MONOCYTES NFR BLD AUTO: 5.4 % (ref 2–6)
MPC BLD CALC-MCNC: 10.9 FL (ref 9.4–12.4)
NEUTROPHILS # BLD AUTO: 6.89 K/UL (ref 1.8–7.7)
NEUTROPHILS NFR BLD AUTO: 76.4 % (ref 53–65)
NRBC # BLD AUTO: 0 X10E3/UL
NRBC, AUTO (.00): 0 %
NT-PROBNP SERPL-MCNC: 553 PG/ML (ref 1–125)
PLATELET # BLD AUTO: 228 K/UL (ref 150–400)
POTASSIUM SERPL-SCNC: 3.9 MMOL/L (ref 3.5–5.1)
PROT SERPL-MCNC: 7.6 G/DL (ref 6.4–8.2)
RBC # BLD AUTO: 3.32 M/UL (ref 4.2–5.4)
SODIUM SERPL-SCNC: 143 MMOL/L (ref 136–145)
TROPONIN I SERPL-MCNC: <0.017 NG/ML
WBC # BLD AUTO: 9.03 K/UL (ref 4.5–11)

## 2021-07-10 PROCEDURE — 93005 ELECTROCARDIOGRAM TRACING: CPT

## 2021-07-10 PROCEDURE — 36415 COLL VENOUS BLD VENIPUNCTURE: CPT | Performed by: FAMILY MEDICINE

## 2021-07-10 PROCEDURE — 83880 ASSAY OF NATRIURETIC PEPTIDE: CPT | Performed by: FAMILY MEDICINE

## 2021-07-10 PROCEDURE — 99283 EMERGENCY DEPT VISIT LOW MDM: CPT | Mod: ,,, | Performed by: FAMILY MEDICINE

## 2021-07-10 PROCEDURE — 93010 ELECTROCARDIOGRAM REPORT: CPT | Mod: ,,, | Performed by: INTERNAL MEDICINE

## 2021-07-10 PROCEDURE — 96374 THER/PROPH/DIAG INJ IV PUSH: CPT

## 2021-07-10 PROCEDURE — 96375 TX/PRO/DX INJ NEW DRUG ADDON: CPT

## 2021-07-10 PROCEDURE — 99283 PR EMERGENCY DEPT VISIT,LEVEL III: ICD-10-PCS | Mod: ,,, | Performed by: FAMILY MEDICINE

## 2021-07-10 PROCEDURE — 63600175 PHARM REV CODE 636 W HCPCS

## 2021-07-10 PROCEDURE — 99285 EMERGENCY DEPT VISIT HI MDM: CPT | Mod: 25

## 2021-07-10 PROCEDURE — 80053 COMPREHEN METABOLIC PANEL: CPT | Performed by: FAMILY MEDICINE

## 2021-07-10 PROCEDURE — 63600175 PHARM REV CODE 636 W HCPCS: Performed by: FAMILY MEDICINE

## 2021-07-10 PROCEDURE — 93010 EKG 12-LEAD: ICD-10-PCS | Mod: ,,, | Performed by: INTERNAL MEDICINE

## 2021-07-10 PROCEDURE — 85025 COMPLETE CBC W/AUTO DIFF WBC: CPT | Performed by: FAMILY MEDICINE

## 2021-07-10 PROCEDURE — 84484 ASSAY OF TROPONIN QUANT: CPT | Performed by: FAMILY MEDICINE

## 2021-07-10 RX ORDER — HYDROXYCHLOROQUINE SULFATE 200 MG/1
200 TABLET, FILM COATED ORAL 2 TIMES DAILY
COMMUNITY
Start: 2021-04-29

## 2021-07-10 RX ORDER — HEPARIN SODIUM (PORCINE) LOCK FLUSH IV SOLN 100 UNIT/ML 100 UNIT/ML
SOLUTION INTRAVENOUS
Status: COMPLETED
Start: 2021-07-10 | End: 2021-07-10

## 2021-07-10 RX ORDER — ALBUTEROL SULFATE 0.83 MG/ML
SOLUTION RESPIRATORY (INHALATION)
COMMUNITY

## 2021-07-10 RX ORDER — BUDESONIDE AND FORMOTEROL FUMARATE DIHYDRATE 80; 4.5 UG/1; UG/1
2 AEROSOL RESPIRATORY (INHALATION) 2 TIMES DAILY
COMMUNITY
Start: 2021-04-10

## 2021-07-10 RX ORDER — ONDANSETRON 2 MG/ML
4 INJECTION INTRAMUSCULAR; INTRAVENOUS
Status: COMPLETED | OUTPATIENT
Start: 2021-07-10 | End: 2021-07-10

## 2021-07-10 RX ORDER — ALBUTEROL SULFATE 0.83 MG/ML
SOLUTION RESPIRATORY (INHALATION)
COMMUNITY
Start: 2021-06-01

## 2021-07-10 RX ORDER — HEPARIN SODIUM (PORCINE) LOCK FLUSH IV SOLN 100 UNIT/ML 100 UNIT/ML
500 SOLUTION INTRAVENOUS ONCE
Status: COMPLETED | OUTPATIENT
Start: 2021-07-10 | End: 2021-07-10

## 2021-07-10 RX ORDER — BUDESONIDE AND FORMOTEROL FUMARATE DIHYDRATE 80; 4.5 UG/1; UG/1
AEROSOL RESPIRATORY (INHALATION)
COMMUNITY

## 2021-07-10 RX ORDER — HYDROMORPHONE HYDROCHLORIDE 2 MG/ML
1 INJECTION, SOLUTION INTRAMUSCULAR; INTRAVENOUS; SUBCUTANEOUS
Status: COMPLETED | OUTPATIENT
Start: 2021-07-10 | End: 2021-07-10

## 2021-07-10 RX ADMIN — HEPARIN SODIUM (PORCINE) LOCK FLUSH IV SOLN 100 UNIT/ML 500 UNITS: 100 SOLUTION at 12:07

## 2021-07-10 RX ADMIN — HYDROMORPHONE HYDROCHLORIDE 1 MG: 2 INJECTION, SOLUTION INTRAMUSCULAR; INTRAVENOUS; SUBCUTANEOUS at 10:07

## 2021-07-10 RX ADMIN — ONDANSETRON 4 MG: 2 INJECTION INTRAMUSCULAR; INTRAVENOUS at 10:07

## 2021-08-16 ENCOUNTER — OFFICE VISIT (OUTPATIENT)
Dept: PAIN MEDICINE | Facility: CLINIC | Age: 61
End: 2021-08-16
Payer: MEDICAID

## 2021-08-16 VITALS
HEART RATE: 94 BPM | BODY MASS INDEX: 31.34 KG/M2 | SYSTOLIC BLOOD PRESSURE: 144 MMHG | DIASTOLIC BLOOD PRESSURE: 100 MMHG | HEIGHT: 61 IN | RESPIRATION RATE: 20 BRPM | WEIGHT: 166 LBS

## 2021-08-16 DIAGNOSIS — M06.9 RHEUMATOID ARTHRITIS, INVOLVING UNSPECIFIED SITE, UNSPECIFIED WHETHER RHEUMATOID FACTOR PRESENT: Chronic | ICD-10-CM

## 2021-08-16 DIAGNOSIS — G89.4 CHRONIC PAIN DISORDER: Chronic | ICD-10-CM

## 2021-08-16 DIAGNOSIS — Z79.899 OTHER LONG TERM (CURRENT) DRUG THERAPY: ICD-10-CM

## 2021-08-16 DIAGNOSIS — M54.16 LUMBAR RADICULOPATHY: Primary | Chronic | ICD-10-CM

## 2021-08-16 PROCEDURE — 99214 OFFICE O/P EST MOD 30 MIN: CPT | Mod: S$PBB,,, | Performed by: PAIN MEDICINE

## 2021-08-16 PROCEDURE — 99214 PR OFFICE/OUTPT VISIT, EST, LEVL IV, 30-39 MIN: ICD-10-PCS | Mod: S$PBB,,, | Performed by: PAIN MEDICINE

## 2021-08-16 PROCEDURE — 99214 OFFICE O/P EST MOD 30 MIN: CPT | Mod: PBBFAC | Performed by: PAIN MEDICINE

## 2021-08-16 RX ORDER — OXYCODONE AND ACETAMINOPHEN 10; 325 MG/1; MG/1
1 TABLET ORAL EVERY 6 HOURS PRN
Qty: 120 TABLET | Refills: 0 | Status: SHIPPED | OUTPATIENT
Start: 2021-08-16 | End: 2021-09-15

## 2021-08-16 RX ORDER — OXYCODONE AND ACETAMINOPHEN 10; 325 MG/1; MG/1
1 TABLET ORAL EVERY 6 HOURS PRN
Qty: 120 TABLET | Refills: 0 | Status: SHIPPED | OUTPATIENT
Start: 2021-09-15 | End: 2021-10-15

## 2021-10-06 ENCOUNTER — HOSPITAL ENCOUNTER (EMERGENCY)
Facility: HOSPITAL | Age: 61
Discharge: HOME | End: 2021-10-06
Payer: MEDICAID

## 2021-10-06 VITALS
TEMPERATURE: 98 F | HEART RATE: 84 BPM | HEIGHT: 62 IN | BODY MASS INDEX: 32.2 KG/M2 | DIASTOLIC BLOOD PRESSURE: 58 MMHG | OXYGEN SATURATION: 92 % | RESPIRATION RATE: 24 BRPM | SYSTOLIC BLOOD PRESSURE: 103 MMHG | WEIGHT: 175 LBS

## 2021-10-06 DIAGNOSIS — J18.9 PNEUMONIA OF LOWER LOBE DUE TO INFECTIOUS ORGANISM, UNSPECIFIED LATERALITY: ICD-10-CM

## 2021-10-06 DIAGNOSIS — R07.89 CHEST WALL PAIN: Primary | ICD-10-CM

## 2021-10-06 DIAGNOSIS — M54.16 LUMBAR RADICULOPATHY: ICD-10-CM

## 2021-10-06 DIAGNOSIS — R07.9 CHEST PAIN: ICD-10-CM

## 2021-10-06 LAB
ALBUMIN SERPL BCP-MCNC: 3.4 G/DL (ref 3.5–5)
ALBUMIN/GLOB SERPL: 0.9 {RATIO}
ALP SERPL-CCNC: 168 U/L (ref 46–118)
ALT SERPL W P-5'-P-CCNC: 33 U/L (ref 13–56)
ANION GAP SERPL CALCULATED.3IONS-SCNC: 13 MMOL/L (ref 7–16)
APTT PPP: 93.7 SECONDS (ref 25.2–37.3)
AST SERPL W P-5'-P-CCNC: 24 U/L (ref 15–37)
BASOPHILS # BLD AUTO: 0.04 K/UL (ref 0–0.2)
BASOPHILS NFR BLD AUTO: 0.5 % (ref 0–1)
BILIRUB SERPL-MCNC: 0.3 MG/DL (ref 0–1.2)
BUN SERPL-MCNC: 12 MG/DL (ref 7–18)
BUN/CREAT SERPL: 12 (ref 6–20)
CALCIUM SERPL-MCNC: 8.6 MG/DL (ref 8.5–10.1)
CHLORIDE SERPL-SCNC: 107 MMOL/L (ref 98–107)
CO2 SERPL-SCNC: 28 MMOL/L (ref 21–32)
CREAT SERPL-MCNC: 0.97 MG/DL (ref 0.55–1.02)
DIFFERENTIAL METHOD BLD: ABNORMAL
EOSINOPHIL # BLD AUTO: 0.2 K/UL (ref 0–0.5)
EOSINOPHIL NFR BLD AUTO: 2.5 % (ref 1–4)
ERYTHROCYTE [DISTWIDTH] IN BLOOD BY AUTOMATED COUNT: 13.2 % (ref 11.5–14.5)
GLOBULIN SER-MCNC: 3.8 G/DL (ref 2–4)
GLUCOSE SERPL-MCNC: 101 MG/DL (ref 74–106)
HCT VFR BLD AUTO: 32.2 % (ref 38–47)
HGB BLD-MCNC: 9.9 G/DL (ref 12–16)
IMM GRANULOCYTES # BLD AUTO: 0.04 K/UL (ref 0–0.04)
IMM GRANULOCYTES NFR BLD: 0.5 % (ref 0–0.4)
INR BLD: 2.7 (ref 0.9–1.1)
LYMPHOCYTES # BLD AUTO: 2.49 K/UL (ref 1–4.8)
LYMPHOCYTES NFR BLD AUTO: 30.9 % (ref 27–41)
MAGNESIUM SERPL-MCNC: 1.7 MG/DL (ref 1.7–2.3)
MCH RBC QN AUTO: 28 PG (ref 27–31)
MCHC RBC AUTO-ENTMCNC: 30.7 G/DL (ref 32–36)
MCV RBC AUTO: 91.2 FL (ref 80–96)
MONOCYTES # BLD AUTO: 0.44 K/UL (ref 0–0.8)
MONOCYTES NFR BLD AUTO: 5.5 % (ref 2–6)
MPC BLD CALC-MCNC: 10.7 FL (ref 9.4–12.4)
NEUTROPHILS # BLD AUTO: 4.85 K/UL (ref 1.8–7.7)
NEUTROPHILS NFR BLD AUTO: 60.1 % (ref 53–65)
NRBC # BLD AUTO: 0 X10E3/UL
NRBC, AUTO (.00): 0 %
PLATELET # BLD AUTO: 209 K/UL (ref 150–400)
POTASSIUM SERPL-SCNC: 3.9 MMOL/L (ref 3.5–5.1)
PROT SERPL-MCNC: 7.2 G/DL (ref 6.4–8.2)
PROTHROMBIN TIME: 28 SECONDS (ref 11.7–14.7)
RBC # BLD AUTO: 3.53 M/UL (ref 4.2–5.4)
SODIUM SERPL-SCNC: 144 MMOL/L (ref 136–145)
TROPONIN I SERPL-MCNC: <0.017 NG/ML
WBC # BLD AUTO: 8.06 K/UL (ref 4.5–11)

## 2021-10-06 PROCEDURE — 83735 ASSAY OF MAGNESIUM: CPT | Performed by: NURSE PRACTITIONER

## 2021-10-06 PROCEDURE — 99282 PR EMERGENCY DEPT VISIT,LEVEL II: ICD-10-PCS | Mod: ,,, | Performed by: NURSE PRACTITIONER

## 2021-10-06 PROCEDURE — 99285 EMERGENCY DEPT VISIT HI MDM: CPT | Mod: 25

## 2021-10-06 PROCEDURE — 85730 THROMBOPLASTIN TIME PARTIAL: CPT | Performed by: NURSE PRACTITIONER

## 2021-10-06 PROCEDURE — 80053 COMPREHEN METABOLIC PANEL: CPT | Performed by: NURSE PRACTITIONER

## 2021-10-06 PROCEDURE — 85025 COMPLETE CBC W/AUTO DIFF WBC: CPT | Performed by: NURSE PRACTITIONER

## 2021-10-06 PROCEDURE — 93010 ELECTROCARDIOGRAM REPORT: CPT | Mod: ,,, | Performed by: HOSPITALIST

## 2021-10-06 PROCEDURE — 84484 ASSAY OF TROPONIN QUANT: CPT | Performed by: NURSE PRACTITIONER

## 2021-10-06 PROCEDURE — 93010 EKG 12-LEAD: ICD-10-PCS | Mod: ,,, | Performed by: HOSPITALIST

## 2021-10-06 PROCEDURE — 93005 ELECTROCARDIOGRAM TRACING: CPT

## 2021-10-06 PROCEDURE — 99282 EMERGENCY DEPT VISIT SF MDM: CPT | Mod: ,,, | Performed by: NURSE PRACTITIONER

## 2021-10-06 PROCEDURE — 85610 PROTHROMBIN TIME: CPT | Performed by: NURSE PRACTITIONER

## 2021-10-06 PROCEDURE — 36415 COLL VENOUS BLD VENIPUNCTURE: CPT | Performed by: NURSE PRACTITIONER

## 2021-10-06 RX ORDER — WARFARIN 10 MG/1
TABLET ORAL
COMMUNITY
End: 2024-04-01 | Stop reason: SDUPTHER

## 2021-10-06 RX ORDER — DIGOXIN 125 MCG
TABLET ORAL
COMMUNITY

## 2021-10-06 RX ORDER — ALBUTEROL SULFATE 90 UG/1
AEROSOL, METERED RESPIRATORY (INHALATION)
COMMUNITY

## 2021-10-06 RX ORDER — AMOXICILLIN AND CLAVULANATE POTASSIUM 500; 125 MG/1; MG/1
TABLET, FILM COATED ORAL
COMMUNITY
End: 2023-06-01

## 2021-10-06 RX ORDER — FUROSEMIDE 40 MG/1
TABLET ORAL
COMMUNITY

## 2021-10-06 RX ORDER — CEFTRIAXONE 1 G/1
INJECTION, POWDER, FOR SOLUTION INTRAMUSCULAR; INTRAVENOUS
COMMUNITY
End: 2023-06-01

## 2021-10-06 RX ORDER — DOXYCYCLINE HYCLATE 100 MG
TABLET ORAL
COMMUNITY

## 2021-10-06 RX ORDER — ENOXAPARIN SODIUM 100 MG/ML
INJECTION SUBCUTANEOUS
COMMUNITY

## 2021-10-06 RX ORDER — BUDESONIDE AND FORMOTEROL FUMARATE DIHYDRATE 160; 4.5 UG/1; UG/1
AEROSOL RESPIRATORY (INHALATION)
COMMUNITY

## 2021-10-06 RX ORDER — CODEINE PHOSPHATE AND GUAIFENESIN 10; 100 MG/5ML; MG/5ML
SOLUTION ORAL
COMMUNITY

## 2021-10-06 RX ORDER — IPRATROPIUM BROMIDE AND ALBUTEROL SULFATE 2.5; .5 MG/3ML; MG/3ML
3 SOLUTION RESPIRATORY (INHALATION)
COMMUNITY

## 2021-10-06 RX ORDER — DEXAMETHASONE SODIUM PHOSPHATE 4 MG/ML
INJECTION, SOLUTION INTRA-ARTICULAR; INTRALESIONAL; INTRAMUSCULAR; INTRAVENOUS; SOFT TISSUE
COMMUNITY

## 2021-10-06 RX ORDER — WARFARIN 7.5 MG/1
TABLET ORAL
COMMUNITY

## 2021-10-13 ENCOUNTER — OFFICE VISIT (OUTPATIENT)
Dept: PAIN MEDICINE | Facility: CLINIC | Age: 61
End: 2021-10-13
Payer: MEDICAID

## 2021-10-13 VITALS
BODY MASS INDEX: 30.58 KG/M2 | HEART RATE: 88 BPM | SYSTOLIC BLOOD PRESSURE: 130 MMHG | RESPIRATION RATE: 18 BRPM | HEIGHT: 61 IN | DIASTOLIC BLOOD PRESSURE: 82 MMHG | WEIGHT: 162 LBS | OXYGEN SATURATION: 98 %

## 2021-10-13 DIAGNOSIS — M54.16 LUMBAR RADICULOPATHY: Chronic | ICD-10-CM

## 2021-10-13 DIAGNOSIS — Z79.01 ENCOUNTER FOR CURRENT LONG-TERM USE OF ANTICOAGULANTS: ICD-10-CM

## 2021-10-13 DIAGNOSIS — M06.9 RHEUMATOID ARTHRITIS, INVOLVING UNSPECIFIED SITE, UNSPECIFIED WHETHER RHEUMATOID FACTOR PRESENT: Chronic | ICD-10-CM

## 2021-10-13 DIAGNOSIS — G89.4 CHRONIC PAIN DISORDER: Chronic | ICD-10-CM

## 2021-10-13 DIAGNOSIS — Z79.899 ENCOUNTER FOR LONG-TERM (CURRENT) USE OF OTHER MEDICATIONS: Primary | ICD-10-CM

## 2021-10-13 LAB
CTP QC/QA: YES
POC (AMP) AMPHETAMINE: NEGATIVE
POC (BAR) BARBITURATES: NEGATIVE
POC (BUP) BUPRENORPHINE: NEGATIVE
POC (BZO) BENZODIAZEPINES: NEGATIVE
POC (COC) COCAINE: NEGATIVE
POC (MDMA) METHYLENEDIOXYMETHAMPHETAMINE 3,4: NEGATIVE
POC (MET) METHAMPHETAMINE: NEGATIVE
POC (MOP) OPIATES: ABNORMAL
POC (MTD) METHADONE: NEGATIVE
POC (OXY) OXYCODONE: NEGATIVE
POC (PCP) PHENCYCLIDINE: NEGATIVE
POC (TCA) TRICYCLIC ANTIDEPRESSANTS: NEGATIVE
POC TEMPERATURE (URINE): 94
POC THC: NEGATIVE

## 2021-10-13 PROCEDURE — 99213 PR OFFICE/OUTPT VISIT, EST, LEVL III, 20-29 MIN: ICD-10-PCS | Mod: S$PBB,,, | Performed by: PAIN MEDICINE

## 2021-10-13 PROCEDURE — G0481 PR DRUG TEST DEF 8-14 CLASSES: ICD-10-PCS | Mod: ,,, | Performed by: CLINICAL MEDICAL LABORATORY

## 2021-10-13 PROCEDURE — 99213 OFFICE O/P EST LOW 20 MIN: CPT | Mod: S$PBB,,, | Performed by: PAIN MEDICINE

## 2021-10-13 PROCEDURE — 80305 DRUG TEST PRSMV DIR OPT OBS: CPT | Mod: PBBFAC | Performed by: PAIN MEDICINE

## 2021-10-13 PROCEDURE — G0481 DRUG TEST DEF 8-14 CLASSES: HCPCS | Mod: ,,, | Performed by: CLINICAL MEDICAL LABORATORY

## 2021-10-13 PROCEDURE — 99215 OFFICE O/P EST HI 40 MIN: CPT | Mod: PBBFAC | Performed by: PAIN MEDICINE

## 2021-10-13 RX ORDER — OXYCODONE AND ACETAMINOPHEN 10; 325 MG/1; MG/1
1 TABLET ORAL EVERY 6 HOURS PRN
Qty: 120 TABLET | Refills: 0 | Status: CANCELLED | OUTPATIENT
Start: 2021-11-13 | End: 2021-12-13

## 2021-10-13 RX ORDER — OXYCODONE AND ACETAMINOPHEN 10; 325 MG/1; MG/1
1 TABLET ORAL EVERY 6 HOURS PRN
Qty: 120 TABLET | Refills: 0 | Status: CANCELLED | OUTPATIENT
Start: 2021-10-15 | End: 2021-11-14

## 2021-10-15 LAB
6-ACETYLMORPHINE, URINE (RUSH): NEGATIVE 10 NG/ML
7-AMINOCLONAZEPAM, URINE (RUSH): NEGATIVE 25 NG/ML
A-HYDROXYALPRAZOLAM, URINE (RUSH): NEGATIVE 25 NG/ML
ACETYL FENTANYL, URINE (RUSH): NEGATIVE 2.5 NG/ML
ACETYL NORFENTANYL OXALATE, URINE (RUSH): NEGATIVE 5 NG/ML
AMPHET UR QL SCN: NEGATIVE 100 NG/ML
BENZOYLECGONINE, URINE (RUSH): NEGATIVE 100 NG/ML
BUPRENORPHINE UR QL SCN: NEGATIVE 25 NG/ML
CODEINE, URINE (RUSH): NEGATIVE 25 NG/ML
CREAT UR-MCNC: 150 MG/DL (ref 28–219)
EDDP, URINE (RUSH): NEGATIVE 25 NG/ML
FENTANYL, URINE (RUSH): NEGATIVE 2.5 NG/ML
HYDROCODONE, URINE (RUSH): >250 25 NG/ML
HYDROMORPHONE, URINE (RUSH): 53.3 25 NG/ML
LORAZEPAM, URINE (RUSH): NEGATIVE 25 NG/ML
METHADONE UR QL SCN: NEGATIVE 25 NG/ML
METHAMPHET UR QL SCN: NEGATIVE 100 NG/ML
MORPHINE, URINE (RUSH): NEGATIVE 25 NG/ML
NORBUPRENORPHINE, URINE (RUSH): NEGATIVE 25 NG/ML
NORDIAZEPAM, URINE (RUSH): NEGATIVE 25 NG/ML
NORFENTANYL OXALATE, URINE (RUSH): NEGATIVE 5 NG/ML
NORHYDROCODONE, URINE (RUSH): >500 50 NG/ML
NOROXYCODONE HCL, URINE (RUSH): >500 50 NG/ML
OXAZEPAM, URINE (RUSH): NEGATIVE 25 NG/ML
OXYCODONE UR QL SCN: NEGATIVE 25 NG/ML
OXYMORPHONE, URINE (RUSH): NEGATIVE 25 NG/ML
PH UR STRIP: 5.5 PH UNITS
SP GR UR STRIP: >=1.03
TAPENTADOL, URINE (RUSH): NEGATIVE 25 NG/ML
TEMAZEPAM, URINE (RUSH): NEGATIVE 25 NG/ML
THC-COOH, URINE (RUSH): NEGATIVE 25 NG/ML
TRAMADOL, URINE (RUSH): NEGATIVE 100 NG/ML

## 2021-10-18 ENCOUNTER — TELEPHONE (OUTPATIENT)
Dept: PAIN MEDICINE | Facility: CLINIC | Age: 61
End: 2021-10-18

## 2021-11-04 ENCOUNTER — HOSPITAL ENCOUNTER (OUTPATIENT)
Dept: RADIOLOGY | Facility: HOSPITAL | Age: 61
Discharge: HOME | End: 2021-11-04
Attending: INTERNAL MEDICINE
Payer: MEDICAID

## 2021-11-04 VITALS — WEIGHT: 162 LBS | BODY MASS INDEX: 30.58 KG/M2 | HEIGHT: 61 IN

## 2021-11-04 DIAGNOSIS — Z12.31 SCREENING MAMMOGRAM, ENCOUNTER FOR: ICD-10-CM

## 2022-04-04 DIAGNOSIS — Z11.59 SCREENING FOR VIRAL DISEASE: Primary | ICD-10-CM

## 2022-04-06 ENCOUNTER — HOSPITAL ENCOUNTER (OUTPATIENT)
Facility: HOSPITAL | Age: 62
Discharge: HOME | End: 2022-04-06
Attending: ANESTHESIOLOGY | Admitting: ANESTHESIOLOGY
Payer: MEDICAID

## 2022-04-06 ENCOUNTER — ANESTHESIA (OUTPATIENT)
Dept: PAIN MEDICINE | Facility: HOSPITAL | Age: 62
End: 2022-04-06
Payer: MEDICAID

## 2022-04-06 ENCOUNTER — ANESTHESIA EVENT (OUTPATIENT)
Dept: PAIN MEDICINE | Facility: HOSPITAL | Age: 62
End: 2022-04-06
Payer: MEDICAID

## 2022-04-06 VITALS
HEART RATE: 59 BPM | RESPIRATION RATE: 20 BRPM | HEART RATE: 61 BPM | OXYGEN SATURATION: 100 % | SYSTOLIC BLOOD PRESSURE: 167 MMHG | WEIGHT: 164 LBS | DIASTOLIC BLOOD PRESSURE: 70 MMHG | BODY MASS INDEX: 30.96 KG/M2 | HEIGHT: 61 IN | RESPIRATION RATE: 14 BRPM | TEMPERATURE: 99 F | SYSTOLIC BLOOD PRESSURE: 143 MMHG | OXYGEN SATURATION: 97 % | DIASTOLIC BLOOD PRESSURE: 66 MMHG

## 2022-04-06 DIAGNOSIS — M25.551 RIGHT HIP PAIN: ICD-10-CM

## 2022-04-06 LAB
GLUCOSE SERPL-MCNC: 85 MG/DL (ref 70–105)
GLUCOSE SERPL-MCNC: 85 MG/DL (ref 70–110)

## 2022-04-06 PROCEDURE — 82962 GLUCOSE BLOOD TEST: CPT

## 2022-04-06 PROCEDURE — 01991 ANES DX/THER NRV BLK&INJ OTH: CPT | Performed by: ANESTHESIOLOGY

## 2022-04-06 PROCEDURE — 37000009 HC ANESTHESIA EA ADD 15 MINS: Performed by: ANESTHESIOLOGY

## 2022-04-06 PROCEDURE — D9220A PRA ANESTHESIA: ICD-10-PCS | Mod: ,,, | Performed by: NURSE ANESTHETIST, CERTIFIED REGISTERED

## 2022-04-06 PROCEDURE — 25000003 PHARM REV CODE 250: Performed by: NURSE ANESTHETIST, CERTIFIED REGISTERED

## 2022-04-06 PROCEDURE — D9220A PRA ANESTHESIA: Mod: ,,, | Performed by: NURSE ANESTHETIST, CERTIFIED REGISTERED

## 2022-04-06 PROCEDURE — 27201423 OPTIME MED/SURG SUP & DEVICES STERILE SUPPLY: Performed by: ANESTHESIOLOGY

## 2022-04-06 PROCEDURE — 37000008 HC ANESTHESIA 1ST 15 MINUTES: Performed by: ANESTHESIOLOGY

## 2022-04-06 PROCEDURE — 63600175 PHARM REV CODE 636 W HCPCS: Performed by: ANESTHESIOLOGY

## 2022-04-06 PROCEDURE — 64447 NJX AA&/STRD FEMORAL NRV IMG: CPT | Performed by: ANESTHESIOLOGY

## 2022-04-06 PROCEDURE — 63600175 PHARM REV CODE 636 W HCPCS: Performed by: NURSE ANESTHETIST, CERTIFIED REGISTERED

## 2022-04-06 PROCEDURE — 25000003 PHARM REV CODE 250: Performed by: ANESTHESIOLOGY

## 2022-04-06 PROCEDURE — 27000716 HC OXISENSOR PROBE, ANY SIZE: Performed by: NURSE ANESTHETIST, CERTIFIED REGISTERED

## 2022-04-06 PROCEDURE — 27000284 HC CANNULA NASAL: Performed by: NURSE ANESTHETIST, CERTIFIED REGISTERED

## 2022-04-06 RX ORDER — LIDOCAINE HYDROCHLORIDE 20 MG/ML
INJECTION, SOLUTION EPIDURAL; INFILTRATION; INTRACAUDAL; PERINEURAL
Status: DISCONTINUED | OUTPATIENT
Start: 2022-04-06 | End: 2022-04-06

## 2022-04-06 RX ORDER — BUPIVACAINE HYDROCHLORIDE 2.5 MG/ML
INJECTION, SOLUTION EPIDURAL; INFILTRATION; INTRACAUDAL
Status: DISCONTINUED | OUTPATIENT
Start: 2022-04-06 | End: 2022-04-06 | Stop reason: HOSPADM

## 2022-04-06 RX ORDER — PROPOFOL 10 MG/ML
VIAL (ML) INTRAVENOUS
Status: DISCONTINUED | OUTPATIENT
Start: 2022-04-06 | End: 2022-04-06

## 2022-04-06 RX ORDER — HEPARIN SODIUM (PORCINE) LOCK FLUSH IV SOLN 100 UNIT/ML 100 UNIT/ML
5 SOLUTION INTRAVENOUS ONCE
Status: COMPLETED | OUTPATIENT
Start: 2022-04-06 | End: 2022-04-06

## 2022-04-06 RX ADMIN — PROPOFOL 50 MG: 10 INJECTION, EMULSION INTRAVENOUS at 08:04

## 2022-04-06 RX ADMIN — HEPARIN SODIUM (PORCINE) LOCK FLUSH IV SOLN 100 UNIT/ML 500 UNITS: 100 SOLUTION at 09:04

## 2022-04-06 RX ADMIN — SODIUM CHLORIDE: 9 INJECTION, SOLUTION INTRAVENOUS at 08:04

## 2022-04-06 RX ADMIN — LIDOCAINE HYDROCHLORIDE 70 MG: 20 INJECTION, SOLUTION INTRAVENOUS at 08:04

## 2022-04-06 NOTE — TRANSFER OF CARE
"Anesthesia Transfer of Care Note    Patient: Lesli Escobar    Procedure(s) Performed: Procedure(s) (LRB):  BLOCK, NERVE, FEMORAL (Right)  BLOCK,NERVE,OBTURATOR (Right)    Patient location: Other: ([facility]    Anesthesia Type: general    Transport from OR: Transported from OR on room air with adequate spontaneous ventilation    Post pain: adequate analgesia    Post assessment: no apparent anesthetic complications    Post vital signs: stable    Level of consciousness: sedated and responds to stimulation    Nausea/Vomiting: no nausea/vomiting    Complications: none    Transfer of care protocol was followed      Last vitals:   Visit Vitals  BP (!) 136/56 (BP Location: Left arm, Patient Position: Lying)   Pulse (!) 58   Temp 37.2 °C (98.9 °F) (Oral)   Resp 14   Ht 5' 1" (1.549 m)   Wt 74.4 kg (164 lb)   SpO2 98%   Breastfeeding No   BMI 30.99 kg/m²     "

## 2022-04-06 NOTE — ANESTHESIA POSTPROCEDURE EVALUATION
Anesthesia Post Evaluation    Patient: Lesli Escobar    Procedure(s) Performed: Procedure(s) (LRB):  BLOCK, NERVE, FEMORAL (Right)  BLOCK,NERVE,OBTURATOR (Right)    Final Anesthesia Type: general      Patient participation: Yes- Able to Participate  Level of consciousness: awake and alert  Post-procedure vital signs: reviewed and stable  Pain management: adequate  Airway patency: patent    PONV status at discharge: No PONV  Anesthetic complications: no      Cardiovascular status: blood pressure returned to baseline, hemodynamically stable and stable  Respiratory status: unassisted  Hydration status: euvolemic  Follow-up not needed.          Vitals Value Taken Time   /75 04/06/22 0936   Temp 97 F 04/06/22 1113   Pulse 61 04/06/22 0936   Resp 14 04/06/22 0935   SpO2 100 % 04/06/22 0936   Vitals shown include unvalidated device data.      Event Time   Out of Recovery 09:35:00         Pain/Deepika Score: Deepika Score: 10 (4/6/2022  9:35 AM)

## 2022-04-06 NOTE — ANESTHESIA PREPROCEDURE EVALUATION
04/06/2022  Lesli Escobar is a 62 y.o., female.    Past Medical History:   Diagnosis Date    Anxiety     Cardiomyopathy     Carotid bruit     Chronic pain syndrome     Coronary artery disease     CVA (cerebral vascular accident)     Depressive disorder     Diabetes mellitus     Hyperlipidemia     Hypertension     Lumbosacral radiculopathy     Lumbosacral spondylolysis     Rheumatoid arthritis     Sacroiliitis     Systemic lupus erythematosus        Past Surgical History:   Procedure Laterality Date    APPENDECTOMY      CARDIAC CATHETERIZATION      CHOLECYSTECTOMY      DILATION AND CURETTAGE OF UTERUS      HYSTERECTOMY      MITRAL VALVE REPLACEMENT      OOPHORECTOMY      right hip replacement Right     Right SI JI Right 12-9-2020, 12-2-2020    Dr Fisher    SELECTIVE INJECTION OF ANESTHETIC AGENT AROUND LUMBAR SPINAL NERVE ROOT BY TRANSFORAMINAL APPROACH Right 07/08/2021    Procedure: BLOCK, SPINAL NERVE ROOT, LUMBAR, SELECTIVE, TRANSFORAMINAL APPROACH, Right L3-4, L4-5 transforaminal epidural steroid injection;  Surgeon: Esperanza Fisher MD;  [facility];  Service: Pain Management;  Laterality: Right;    TUBAL LIGATION         Family History   Problem Relation Age of Onset    Stroke Mother     Lung cancer Mother     Stroke Father     Diabetes Sister     Hypertension Sister     Throat cancer Brother        Social History     Socioeconomic History    Marital status:    Tobacco Use    Smoking status: Former Smoker    Smokeless tobacco: Never Used   Substance and Sexual Activity    Alcohol use: Not Currently       No current facility-administered medications for this encounter.       Review of patient's allergies indicates:   Allergen Reactions    Prochlorperazine Other (See Comments)     Hallucination      Ketorolac Itching     Pre-op  Assessment    I have reviewed the Patient Summary Reports.     I have reviewed the Nursing Notes. I have reviewed the NPO Status.   I have reviewed the Medications.     Review of Systems  Anesthesia Hx:  No problems with previous Anesthesia  Denies Family Hx of Anesthesia complications.   Denies Personal Hx of Anesthesia complications.   Social:  Smoker    Cardiovascular:   Exercise tolerance: poor Hypertension CAD   hyperlipidemia ECG has been reviewed.    Musculoskeletal:  Musculoskeletal General/Symptoms: low back pain, joint pain.  Denies Lumbar Spine Disorders   Neurological:   CVA Neuromuscular Disease,  Pain Syndrome  Chronic Pain Syndrome   Endocrine:   Diabetes, type 2  Diabetes, Type 2 Diabetes  Obesity / BMI > 30  Psych:   Psychiatric History  Anxiety Disorder.  Depression.          Physical Exam  General: Well nourished, Alert, Oriented and Cooperative    Airway:  Mouth Opening: Normal  Neck ROM: Normal ROM    Chest/Lungs:  Normal Respiratory Rate    Heart:  Rate: Normal        Anesthesia Plan  Type of Anesthesia, risks & benefits discussed:    Anesthesia Type: Gen Natural Airway, MAC  Intra-op Monitoring Plan: Standard ASA Monitors  Post Op Pain Control Plan: multimodal analgesia and IV/PO Opioids PRN  Induction:  IV  Informed Consent: Informed consent signed with the Patient and all parties understand the risks and agree with anesthesia plan.  All questions answered. Patient consented to blood products? Yes  ASA Score: 3  Day of Surgery Review of History & Physical: I have interviewed and examined the patient. I have reviewed the patient's H&P dated: There are no significant changes.     Ready For Surgery From Anesthesia Perspective.     .

## 2022-04-06 NOTE — DISCHARGE INSTRUCTIONS
INFORMED PATIENT IF UNABLE TO VOID IN 8 HOURS, GO TO ER. NOTIFY MD OF REDNESS OR DRAINAGE FROM INJECTION SITE OR FEVER OVER 3-4 DAY. REST AND DRINK PLENTY OF FLUIDS FOR THE REMAINDER OF THE DAY. NO LIFTING OVER 5 LBS FOR THE REMAINDER OF THE DAY. CONTINUE REGULAR MEDICATIONS AS PRESCRIBED. MAY TAKE PAIN MEDICATION AS PRESCRIBED.

## 2022-04-06 NOTE — OP NOTE
PROCEDURE:Right Obturator and Femoral Nerve Block    1. Right articular branch of femoral nerve block    2. Right articular branch of obturator nerve block    DIAGNOSIS: Right hip pain.    ANESTHESIA: Mac    COMPLICATIONS:None    ESTIMATED BLOOD LOSS: None    PROCEDURE IN DETAIL:         Informed consent was obtained and documented The patient was brought to the procedure room and placed on the exam table in a comfortable supine position. Anesthesia was iniate The place for the needle placement was obtained by manual palpation with radiographic confirmation. The sterile field was prepared with chloraprep and sterile drapes. Local anesthesia superficial and deep was provided by local infiltration of Sensorcaine 0.25%. Using fluoroscopic guidance a 22gage 4 3/4 inch needle was advanced to the anteromedial aspect of the extraarticular portion of the hip joint where the articular branch of the femoral nerve traverses until a bony endpoint is felt. Attempted aspiration yielded no blood. 2cc of Sensorcaine 0.25%was injected. The needle was then withdrawn. A second 22gage 4 3/4 inch needle was placed and using fluoroscopic guidance the needle was advanced to the incisura of the acetabulum where the articular branch of the obturator nerve traverses until a bony endpoint was met. Attempted aspiration yielded no blood. Radiographs were made. 2cc Sensorcaine 0.25% was injected through the needle. The needle was then withdrawn. The patient tolerated the procedure well. After observation the patient was discharged with instructions and follow up. They were also provided contact information to call regarding any concerning symptoms or questions.

## 2022-04-06 NOTE — PLAN OF CARE
REFER TO WRITTEN DOCUMENT AND RECOVERY INFORMATION.    D/CD PATIENT VIAA WHEELCHAIR .    INFORMED PATIENT IF UNABLE TO VOID IN 8 HOURS, GO TO ER. NOTIFY MD OF REDNESS OR DRAINAGE FROM INJECTION SITE OR FEVER OVER 3-4 DAY. REST AND DRINK PLENTY OF FLUIDS FOR THE REMAINDER OF THE DAY. NO LIFTING OVER 5 LBS FOR THE REMAINDER OF THE DAY. CONTINUE REGULAR MEDICATIONS AS PRESCRIBED. MAY TAKE PAIN MEDICATION AS PRESCRIBED.     PAIN IMPROVED  11%

## 2022-04-18 ENCOUNTER — HOSPITAL ENCOUNTER (OUTPATIENT)
Dept: RADIOLOGY | Facility: HOSPITAL | Age: 62
Discharge: HOME | End: 2022-04-18
Attending: ORTHOPAEDIC SURGERY
Payer: MEDICAID

## 2022-04-18 DIAGNOSIS — M25.552 LEFT HIP PAIN: ICD-10-CM

## 2022-04-18 PROBLEM — M16.12 PRIMARY OSTEOARTHRITIS OF LEFT HIP: Status: ACTIVE | Noted: 2022-04-18

## 2022-04-18 PROCEDURE — 73502 X-RAY EXAM HIP UNI 2-3 VIEWS: CPT | Mod: TC,LT

## 2023-03-14 PROBLEM — M32.9 SYSTEMIC LUPUS ERYTHEMATOSUS: Status: ACTIVE | Noted: 2023-03-14

## 2023-03-14 PROBLEM — D64.9 ANEMIA: Status: ACTIVE | Noted: 2021-10-21

## 2023-03-14 PROBLEM — I50.810 RIGHT HEART FAILURE: Status: ACTIVE | Noted: 2020-03-24

## 2023-03-14 PROBLEM — I50.22 CHRONIC SYSTOLIC CHF (CONGESTIVE HEART FAILURE): Status: ACTIVE | Noted: 2020-02-05

## 2023-03-14 PROBLEM — E78.5 HYPERLIPIDEMIA: Status: ACTIVE | Noted: 2021-02-11

## 2023-03-14 PROBLEM — Z79.01 LONG TERM CURRENT USE OF ANTICOAGULANT: Status: ACTIVE | Noted: 2021-02-11

## 2023-03-14 PROBLEM — M19.90 ARTHRITIS: Status: ACTIVE | Noted: 2023-03-14

## 2023-03-14 PROBLEM — B19.20 HEPATITIS C: Status: ACTIVE | Noted: 2022-02-07

## 2023-03-14 PROBLEM — Z95.2 H/O MITRAL VALVE REPLACEMENT WITH MECHANICAL VALVE: Status: ACTIVE | Noted: 2023-03-14

## 2023-03-14 PROBLEM — Z86.010 HISTORY OF COLONIC POLYPS: Status: ACTIVE | Noted: 2021-02-11

## 2023-03-14 PROBLEM — J18.9 PNEUMONIA: Status: ACTIVE | Noted: 2023-03-14

## 2023-03-14 PROBLEM — I51.9 HEART DISEASE: Status: ACTIVE | Noted: 2023-03-14

## 2023-03-14 PROBLEM — Z86.0100 HISTORY OF COLONIC POLYPS: Status: ACTIVE | Noted: 2021-02-11

## 2023-03-14 PROBLEM — M35.9 DISORDER OF CONNECTIVE TISSUE: Status: ACTIVE | Noted: 2021-02-11

## 2023-03-14 PROBLEM — R07.89 OTHER CHEST PAIN: Status: ACTIVE | Noted: 2020-02-06

## 2023-03-14 PROBLEM — Z86.718 HISTORY OF VENOUS THROMBOSIS: Status: ACTIVE | Noted: 2021-02-11

## 2023-03-14 PROBLEM — J45.909 ASTHMA: Status: ACTIVE | Noted: 2021-02-11

## 2023-03-14 PROBLEM — E11.9 DIABETES MELLITUS WITHOUT COMPLICATION: Status: ACTIVE | Noted: 2021-02-11

## 2023-03-14 PROBLEM — J01.90 SINUSITIS, ACUTE: Status: ACTIVE | Noted: 2020-04-28

## 2023-03-14 PROBLEM — E13.42 POLYNEUROPATHY DUE TO SECONDARY DIABETES MELLITUS: Status: ACTIVE | Noted: 2020-05-13

## 2023-03-14 PROBLEM — M06.9 RHEUMATOID ARTHRITIS INVOLVING MULTIPLE SITES: Status: ACTIVE | Noted: 2020-01-27

## 2023-03-14 PROBLEM — Z96.641 HISTORY OF RIGHT HIP REPLACEMENT: Status: ACTIVE | Noted: 2021-02-11

## 2023-03-14 PROBLEM — Z95.828 S/P IVC FILTER: Status: ACTIVE | Noted: 2020-02-05

## 2023-03-16 PROBLEM — G89.4 CHRONIC PAIN SYNDROME: Status: ACTIVE | Noted: 2020-05-13

## 2023-06-01 ENCOUNTER — OFFICE VISIT (OUTPATIENT)
Dept: NEUROLOGY | Facility: CLINIC | Age: 63
End: 2023-06-01
Payer: MEDICAID

## 2023-06-01 VITALS
BODY MASS INDEX: 27.94 KG/M2 | WEIGHT: 148 LBS | OXYGEN SATURATION: 99 % | HEART RATE: 66 BPM | DIASTOLIC BLOOD PRESSURE: 82 MMHG | HEIGHT: 61 IN | SYSTOLIC BLOOD PRESSURE: 130 MMHG

## 2023-06-01 DIAGNOSIS — G44.52 NEW DAILY PERSISTENT HEADACHE: ICD-10-CM

## 2023-06-01 DIAGNOSIS — G45.9 TIA (TRANSIENT ISCHEMIC ATTACK): Primary | ICD-10-CM

## 2023-06-01 PROCEDURE — 1159F PR MEDICATION LIST DOCUMENTED IN MEDICAL RECORD: ICD-10-PCS | Mod: CPTII,,, | Performed by: PSYCHIATRY & NEUROLOGY

## 2023-06-01 PROCEDURE — 3079F PR MOST RECENT DIASTOLIC BLOOD PRESSURE 80-89 MM HG: ICD-10-PCS | Mod: CPTII,,, | Performed by: PSYCHIATRY & NEUROLOGY

## 2023-06-01 PROCEDURE — 99204 PR OFFICE/OUTPT VISIT, NEW, LEVL IV, 45-59 MIN: ICD-10-PCS | Mod: S$PBB,,, | Performed by: PSYCHIATRY & NEUROLOGY

## 2023-06-01 PROCEDURE — 4010F ACE/ARB THERAPY RXD/TAKEN: CPT | Mod: CPTII,,, | Performed by: PSYCHIATRY & NEUROLOGY

## 2023-06-01 PROCEDURE — 99204 OFFICE O/P NEW MOD 45 MIN: CPT | Mod: S$PBB,,, | Performed by: PSYCHIATRY & NEUROLOGY

## 2023-06-01 PROCEDURE — 1160F RVW MEDS BY RX/DR IN RCRD: CPT | Mod: CPTII,,, | Performed by: PSYCHIATRY & NEUROLOGY

## 2023-06-01 PROCEDURE — 99215 OFFICE O/P EST HI 40 MIN: CPT | Mod: PBBFAC | Performed by: PSYCHIATRY & NEUROLOGY

## 2023-06-01 PROCEDURE — 3075F SYST BP GE 130 - 139MM HG: CPT | Mod: CPTII,,, | Performed by: PSYCHIATRY & NEUROLOGY

## 2023-06-01 PROCEDURE — 1159F MED LIST DOCD IN RCRD: CPT | Mod: CPTII,,, | Performed by: PSYCHIATRY & NEUROLOGY

## 2023-06-01 PROCEDURE — 3079F DIAST BP 80-89 MM HG: CPT | Mod: CPTII,,, | Performed by: PSYCHIATRY & NEUROLOGY

## 2023-06-01 PROCEDURE — 4010F PR ACE/ARB THEARPY RXD/TAKEN: ICD-10-PCS | Mod: CPTII,,, | Performed by: PSYCHIATRY & NEUROLOGY

## 2023-06-01 PROCEDURE — 3008F BODY MASS INDEX DOCD: CPT | Mod: CPTII,,, | Performed by: PSYCHIATRY & NEUROLOGY

## 2023-06-01 PROCEDURE — 3008F PR BODY MASS INDEX (BMI) DOCUMENTED: ICD-10-PCS | Mod: CPTII,,, | Performed by: PSYCHIATRY & NEUROLOGY

## 2023-06-01 PROCEDURE — 1160F PR REVIEW ALL MEDS BY PRESCRIBER/CLIN PHARMACIST DOCUMENTED: ICD-10-PCS | Mod: CPTII,,, | Performed by: PSYCHIATRY & NEUROLOGY

## 2023-06-01 PROCEDURE — 3075F PR MOST RECENT SYSTOLIC BLOOD PRESS GE 130-139MM HG: ICD-10-PCS | Mod: CPTII,,, | Performed by: PSYCHIATRY & NEUROLOGY

## 2023-06-01 RX ORDER — AMITRIPTYLINE HYDROCHLORIDE 75 MG/1
75 TABLET ORAL NIGHTLY
Qty: 90 TABLET | Refills: 3 | Status: SHIPPED | OUTPATIENT
Start: 2023-06-01

## 2023-06-01 NOTE — PATIENT INSTRUCTIONS
Cont ASPIRIN and Coumadin and statin   F/u Cards regularly  Cont Elavil but incr to 75 mg bedtime   Stop smoking tobacco  Caution w/ narcotic pain meds   Control risk factors   F/u 6 months

## 2023-06-01 NOTE — PROGRESS NOTES
Subjective:       Patient ID: Lesli Escobar is a 63 y.o. female     Chief Complaint:    Chief Complaint   Patient presents with    Stroke    Headache        Allergies:  Prochlorperazine and Ketorolac    Current Medications:    Outpatient Encounter Medications as of 6/1/2023   Medication Sig Dispense Refill    albuterol (PROVENTIL) 2.5 mg /3 mL (0.083 %) nebulizer solution albuterol sulfate 2.5 mg/3 mL (0.083 %) solution for nebulization   ONE BY NEBULIZER 4 TIMES A DAY AS NEEDED SHORTNESS OF BREATH wheezing      albuterol (PROVENTIL) 2.5 mg /3 mL (0.083 %) nebulizer solution ONE BY NEBULIZER 4 TIMES A DAY AS NEEDED SHORTNESS OF BREATH wheezing      albuterol (PROVENTIL/VENTOLIN HFA) 90 mcg/actuation inhaler Ventolin HFA 90 mcg/actuation aerosol inhaler   Inhale 2 puffs every 4 hours by inhalation route as needed.      albuterol-ipratropium (DUO-NEB) 2.5 mg-0.5 mg/3 mL nebulizer solution 3 mLs.      ALPRAZolam (XANAX) 1 MG tablet TAKE 1 TABLET BY MOUTH ONCE DAILY AS NEEDED FOR ANXIETY AVOID ALCOHOL CAUSES DROWSINESS      amitriptyline (ELAVIL) 50 MG tablet Take 50 mg by mouth every evening.      aspirin (ECOTRIN) 81 MG EC tablet Take 81 mg by mouth.      atorvastatin (LIPITOR) 80 MG tablet Take 80 mg by mouth every evening.      budesonide-formoterol 160-4.5 mcg (SYMBICORT) 160-4.5 mcg/actuation HFAA budesonide-formoterol  mcg-4.5 mcg/actuation aerosol inhaler   INHALE 2 PUFFS BY MOUTH TWICE DAILY AS DIRECTED (RINSE MOUTH AFTER USE)      budesonide-formoterol 80-4.5 mcg (SYMBICORT) 80-4.5 mcg/actuation HFAA budesonide-formoterol HFA 80 mcg-4.5 mcg/actuation aerosol inhaler   INHALE 2 PUFFS BY MOUTH TWICE DAILY      budesonide-formoterol 80-4.5 mcg (SYMBICORT) 80-4.5 mcg/actuation HFAA Inhale 2 puffs into the lungs 2 (two) times daily.      carvediloL (COREG) 6.25 MG tablet       cefdinir (OMNICEF) 300 MG capsule TAKE ONE CAPSULE BY MOUTH TWICE DAILY FOR 10 DAYS      ergocalciferol (ERGOCALCIFEROL)  "50,000 unit Cap Take 50,000 Units by mouth every 7 days.      ferrous sulfate (FEOSOL) 325 mg (65 mg iron) Tab tablet ferrous sulfate 325 mg (65 mg iron) tablet   Take 1 tablet twice a day by oral route for 30 days.      fluticasone propionate (FLONASE) 50 mcg/actuation nasal spray INHALE 1 SPRAY IN EACH NOSTRIL ONCE DAILY FOR 30 DAYS.. (SHAKE BEFORE USE)      furosemide (LASIX) 40 MG tablet furosemide 40 mg tablet   TAKE 1 TABLET BY MOUTH ONCE DAILY FOR FLUID      gabapentin (NEURONTIN) 300 MG capsule Take 300 mg by mouth 3 (three) times daily.      glimepiride (AMARYL) 4 MG tablet 1 tablet with breakfast or the first main meal of the day      leflunomide (ARAVA) 10 MG Tab Take 10 mg by mouth.      lisinopriL 10 MG tablet Take 10 mg by mouth once daily.      loratadine (CLARITIN) 10 mg tablet TAKE 1 TABLET BY MOUTH ONCE DAILY FOR allergies      losartan (COZAAR) 25 MG tablet TAKE 1 TABLET BY MOUTH AT BEDTIME FOR HIGH BLOOD PRESSURE      metFORMIN (GLUCOPHAGE) 500 MG tablet Take 500 mg by mouth 2 (two) times daily with meals.      ONETOUCH DELICA PLUS LANCET 33 gauge Misc       ONETOUCH VERIO REFLECT METER [facility]       ONETOUCH VERIO TEST STRIPS Strp       oxyCODONE-acetaminophen (PERCOCET)  mg per tablet 1 tablet as needed      oxyCODONE-acetaminophen (PERCOCET)  mg per tablet Take 1 tablet by mouth 4 (four) times daily as needed.      predniSONE (DELTASONE) 10 MG tablet prednisone 10 mg tablet   3 TABS EVERY A.M. X3DAYS THEN 2 TABS EVERY A.M. X3DAYS THEN 1 TAB EVERY MORNING X3DAY THEN 1 TAB EVERY MORNING X3DAY THEN STOP "TAKE WITH FOOD"      predniSONE (DELTASONE) 2.5 MG tablet 1/2 tab      predniSONE (DELTASONE) 5 MG tablet Take 5 mg by mouth.      rosuvastatin (CRESTOR) 5 MG tablet Crestor 5 mg tablet   Take 1 tablet every day by oral route at bedtime for 30 days.      sofosbuvir-velpatasvir 400-100 mg Tab sofosbuvir 400 mg-velpatasvir 100 mg tablet   TAKE ONE TABLET BY MOUTH DAILY      tiZANidine " "(ZANAFLEX) 4 MG tablet 1 tablet as needed      warfarin (COUMADIN) 7.5 MG tablet warfarin 7.5 mg tablet   TAKE 1 TABLET BY MOUTH EVERY NIGHT AT BEDTIME      amoxicillin-clavulanate 500-125mg (AUGMENTIN) 500-125 mg Tab amoxicillin 500 mg-potassium clavulanate 125 mg tablet   TAKE 1 TABLET BY MOUTH EVERY 12 HOURS UNTIL ALL TAKEN FOR INFECTION "TAKE WITH FOOD"      cefTRIAXone (ROCEPHIN) 1 gram injection ceftriaxone 1 gram solution for injection   IM TIMES 1      dexamethasone (DECADRON) 4 mg/mL injection dexamethasone sodium phosphate 4 mg/mL injection solution   1 CC IM TIMES 1      digoxin (LANOXIN) 125 mcg tablet digoxin 125 mcg (0.125 mg) tablet   TAKE 2 TABLETS BY MOUTH EVERY DAY      doxycycline (VIBRA-TABS) 100 MG tablet doxycycline hyclate 100 mg tablet   Take 1 tablet twice a day by oral route for 10 days.      enoxaparin (LOVENOX) 80 mg/0.8 mL Syrg enoxaparin 80 mg/0.8 mL subcutaneous syringe   80MG UNDER THE SKIN EVERY 12 HOURS FOR 3 DAYS      guaiFENesin-codeine 100-10 mg/5 ml (TUSSI-ORGANIDIN NR)  mg/5 mL syrup codeine 10 mg-guaifenesin 100 mg/5 mL oral liquid   TAKE TWO TEASPOONSFULL (10ml) BY MOUTH 4 TIMES A DAY FOR 15 DAYS FOR COUGH AND CONGESTION .. may cause drowsiness / no alcohol / no driving      hydroCHLOROthiazide (HYDRODIURIL) 12.5 MG Tab Take 12.5 mg by mouth once daily.      HYDROcodone-acetaminophen (NORCO)  mg per tablet hydrocodone 10 mg-acetaminophen 325 mg tablet   Take 1 tablet every 6 hours by oral route as needed for 7 days.      hydrOXYchloroQUINE (PLAQUENIL) 200 mg tablet Take 200 mg by mouth 2 (two) times daily.      warfarin (COUMADIN) 10 MG tablet warfarin 10 mg tablet   Take 1 tablet orally once on MON, WED, FRI.       No facility-administered encounter medications on file as of 6/1/2023.       History of Present Illness  62 yo lady referred for hx of TRANSIENT ISCHEMIC ATTACK's currnelty  on ASPIRIN and Warfarin  Long hx of chronic pain syndrome, anxiety, " DM,polyneuropathy,HTN,XOL,CHF- MVR, lung cancer requiring resection - but still smoking tobacco   Pt had stroke FEB 2023 in [place] where had full stroke w/u - MRI reportedly showed stroke but I have no records for review?   They recommended staying on ASPIRIN and coumadin followed by [facility]  Also has HEADACHE's almost daily but Elavil helps reduce them - needs Xanax at night to also help her anxiety and sleep          Past Medical History:   Diagnosis Date    Anxiety     Cardiomyopathy     Carotid bruit     Chronic pain syndrome     Coronary artery disease     CVA (cerebral vascular accident)     Depressive disorder     Diabetes mellitus     Hyperlipidemia     Hypertension     Lumbosacral radiculopathy     Lumbosacral spondylolysis     Rheumatoid arthritis     Sacroiliitis     Systemic lupus erythematosus        Past Surgical History:   Procedure Laterality Date    APPENDECTOMY      BLOCK, NERVE, FEMORAL Right 4/6/2022    Procedure: BLOCK, NERVE, FEMORAL;  Surgeon: Artemio yLnn MD;  Location: [facility];  Service: Pain Management;  Laterality: Right;  Right Obturator/femoral NB    BLOCK, NERVE, OBTURATOR Right 4/6/2022    Procedure: BLOCK,NERVE,OBTURATOR;  Surgeon: Artemio Lynn MD;  Location: [facility];  Service: Pain Management;  Laterality: Right;    CARDIAC CATHETERIZATION      CHOLECYSTECTOMY      DILATION AND CURETTAGE OF UTERUS      HYSTERECTOMY      MITRAL VALVE REPLACEMENT      OOPHORECTOMY      right hip replacement Right     Right SI JI Right 12-9-2020, 12-2-2020    Dr Fisher    SELECTIVE INJECTION OF ANESTHETIC AGENT AROUND LUMBAR SPINAL NERVE ROOT BY TRANSFORAMINAL APPROACH Right 07/08/2021    Procedure: BLOCK, SPINAL NERVE ROOT, LUMBAR, SELECTIVE, TRANSFORAMINAL APPROACH, Right L3-4, L4-5 transforaminal epidural steroid injection;  Surgeon: Esperanza Fisher MD;  Location: [facility];  Service: Pain Management;  Laterality: Right;    TUBAL LIGATION         Social  "History  Ms. Escobar  reports that she has quit smoking. She has been exposed to tobacco smoke. She has never used smokeless tobacco. She reports that she does not currently use alcohol.    Family History  Ms.'s Escobar family history includes Diabetes in her sister; Hypertension in her sister; Lung cancer in her mother; Stroke in her father and mother; Throat cancer in her brother.    Review of Systems  Review of Systems   Musculoskeletal:  Positive for back pain, joint pain and myalgias.   Neurological:  Positive for tingling, sensory change and headaches.   Psychiatric/Behavioral:  Positive for depression. The patient is nervous/anxious.    All other systems reviewed and are negative.   Objective:   /82 (BP Location: Right arm, Patient Position: Sitting, BP Method: Large (Manual))   Pulse 66   Ht 5' 1" (1.549 m)   Wt 67.1 kg (148 lb)   SpO2 99%   BMI 27.96 kg/m²    NEUROLOGICAL EXAMINATION:     MENTAL STATUS   Oriented to person, place, and time.   Level of consciousness: alert  Knowledge: good.        Depressed      CRANIAL NERVES   Cranial nerves II through XII intact.     MOTOR EXAM     Strength   Strength 5/5 throughout.     GAIT AND COORDINATION     Gait  Gait: normal     Physical Exam  Vitals reviewed.   Constitutional:       Appearance: She is normal weight.   Neurological:      Mental Status: She is alert and oriented to person, place, and time. Mental status is at baseline.      Cranial Nerves: Cranial nerves 2-12 are intact.      Motor: Motor strength is normal.      Gait: Gait is intact.        Assessment:     TIA (transient ischemic attack)    New daily persistent headache         Primary Diagnosis and ICD10  TIA (transient ischemic attack) [G45.9]    Plan:     Patient Instructions   Cont ASPIRIN and Coumadin and statin   F/u Cards regularly  Cont Elavil but incr to 75 mg bedtime   Stop smoking tobacco  Caution w/ narcotic pain meds   Control risk factors   F/u 6 months    There are no " discontinued medications.    Requested Prescriptions      No prescriptions requested or ordered in this encounter     HTN,XOL,RA, DVT,CHF- MVR

## 2023-06-19 PROBLEM — J18.9 PNEUMONIA: Status: RESOLVED | Noted: 2023-03-14 | Resolved: 2023-06-19

## 2023-06-19 PROBLEM — J01.90 SINUSITIS, ACUTE: Status: RESOLVED | Noted: 2020-04-28 | Resolved: 2023-06-19

## 2023-06-27 ENCOUNTER — APPOINTMENT (OUTPATIENT)
Dept: RADIOLOGY | Facility: CLINIC | Age: 63
End: 2023-06-27
Attending: INTERNAL MEDICINE
Payer: MEDICAID

## 2023-06-27 ENCOUNTER — OFFICE VISIT (OUTPATIENT)
Dept: FAMILY MEDICINE | Facility: CLINIC | Age: 63
End: 2023-06-27
Payer: MEDICAID

## 2023-06-27 VITALS
HEART RATE: 93 BPM | RESPIRATION RATE: 18 BRPM | BODY MASS INDEX: 28.63 KG/M2 | HEIGHT: 61 IN | OXYGEN SATURATION: 98 % | DIASTOLIC BLOOD PRESSURE: 80 MMHG | SYSTOLIC BLOOD PRESSURE: 116 MMHG | WEIGHT: 151.63 LBS | TEMPERATURE: 98 F

## 2023-06-27 DIAGNOSIS — R10.9 SIDE PAIN: ICD-10-CM

## 2023-06-27 DIAGNOSIS — L84 SKIN CALLUS: ICD-10-CM

## 2023-06-27 DIAGNOSIS — R10.9 ABDOMINAL PAIN, UNSPECIFIED ABDOMINAL LOCATION: Primary | ICD-10-CM

## 2023-06-27 DIAGNOSIS — R10.9 ABDOMINAL PAIN, UNSPECIFIED ABDOMINAL LOCATION: ICD-10-CM

## 2023-06-27 DIAGNOSIS — Z85.828 HISTORY OF SKIN CANCER: ICD-10-CM

## 2023-06-27 DIAGNOSIS — E11.9 TYPE 2 DIABETES MELLITUS WITHOUT COMPLICATION, WITHOUT LONG-TERM CURRENT USE OF INSULIN: ICD-10-CM

## 2023-06-27 LAB
ANION GAP SERPL CALCULATED.3IONS-SCNC: 13 MMOL/L (ref 7–16)
BILIRUB UR QL STRIP: NEGATIVE
BUN SERPL-MCNC: 13 MG/DL (ref 7–18)
BUN/CREAT SERPL: 15 (ref 6–20)
CALCIUM SERPL-MCNC: 9.7 MG/DL (ref 8.5–10.1)
CHLORIDE SERPL-SCNC: 110 MMOL/L (ref 98–107)
CLARITY UR: CLEAR
CO2 SERPL-SCNC: 23 MMOL/L (ref 21–32)
COLOR UR: NORMAL
CREAT SERPL-MCNC: 0.84 MG/DL (ref 0.55–1.02)
EGFR (NO RACE VARIABLE) (RUSH/TITUS): 79 ML/MIN/1.73M2
GLUCOSE SERPL-MCNC: 85 MG/DL (ref 74–106)
GLUCOSE UR STRIP-MCNC: NORMAL MG/DL
KETONES UR STRIP-SCNC: NEGATIVE MG/DL
LEUKOCYTE ESTERASE UR QL STRIP: NEGATIVE
NITRITE UR QL STRIP: NEGATIVE
PH UR STRIP: 5.5 PH UNITS
POTASSIUM SERPL-SCNC: 3.5 MMOL/L (ref 3.5–5.1)
PROT UR QL STRIP: NEGATIVE
RBC # UR STRIP: NEGATIVE /UL
SODIUM SERPL-SCNC: 142 MMOL/L (ref 136–145)
SP GR UR STRIP: 1.02
UROBILINOGEN UR STRIP-ACNC: NORMAL MG/DL

## 2023-06-27 PROCEDURE — 85025 COMPLETE CBC W/AUTO DIFF WBC: CPT | Mod: ,,, | Performed by: CLINICAL MEDICAL LABORATORY

## 2023-06-27 PROCEDURE — 3079F PR MOST RECENT DIASTOLIC BLOOD PRESSURE 80-89 MM HG: ICD-10-PCS | Mod: CPTII,,, | Performed by: INTERNAL MEDICINE

## 2023-06-27 PROCEDURE — 4010F PR ACE/ARB THEARPY RXD/TAKEN: ICD-10-PCS | Mod: CPTII,,, | Performed by: INTERNAL MEDICINE

## 2023-06-27 PROCEDURE — 1159F PR MEDICATION LIST DOCUMENTED IN MEDICAL RECORD: ICD-10-PCS | Mod: CPTII,,, | Performed by: INTERNAL MEDICINE

## 2023-06-27 PROCEDURE — 3079F DIAST BP 80-89 MM HG: CPT | Mod: CPTII,,, | Performed by: INTERNAL MEDICINE

## 2023-06-27 PROCEDURE — 3074F PR MOST RECENT SYSTOLIC BLOOD PRESSURE < 130 MM HG: ICD-10-PCS | Mod: CPTII,,, | Performed by: INTERNAL MEDICINE

## 2023-06-27 PROCEDURE — 99214 PR OFFICE/OUTPT VISIT, EST, LEVL IV, 30-39 MIN: ICD-10-PCS | Mod: ,,, | Performed by: INTERNAL MEDICINE

## 2023-06-27 PROCEDURE — 1160F RVW MEDS BY RX/DR IN RCRD: CPT | Mod: CPTII,,, | Performed by: INTERNAL MEDICINE

## 2023-06-27 PROCEDURE — 81003 URINALYSIS, REFLEX TO URINE CULTURE: ICD-10-PCS | Mod: QW,,, | Performed by: CLINICAL MEDICAL LABORATORY

## 2023-06-27 PROCEDURE — 99214 OFFICE O/P EST MOD 30 MIN: CPT | Mod: ,,, | Performed by: INTERNAL MEDICINE

## 2023-06-27 PROCEDURE — 74018 RADEX ABDOMEN 1 VIEW: CPT | Mod: TC,RHCUB | Performed by: INTERNAL MEDICINE

## 2023-06-27 PROCEDURE — 3008F PR BODY MASS INDEX (BMI) DOCUMENTED: ICD-10-PCS | Mod: CPTII,,, | Performed by: INTERNAL MEDICINE

## 2023-06-27 PROCEDURE — 3008F BODY MASS INDEX DOCD: CPT | Mod: CPTII,,, | Performed by: INTERNAL MEDICINE

## 2023-06-27 PROCEDURE — 4010F ACE/ARB THERAPY RXD/TAKEN: CPT | Mod: CPTII,,, | Performed by: INTERNAL MEDICINE

## 2023-06-27 PROCEDURE — 1160F PR REVIEW ALL MEDS BY PRESCRIBER/CLIN PHARMACIST DOCUMENTED: ICD-10-PCS | Mod: CPTII,,, | Performed by: INTERNAL MEDICINE

## 2023-06-27 PROCEDURE — 1159F MED LIST DOCD IN RCRD: CPT | Mod: CPTII,,, | Performed by: INTERNAL MEDICINE

## 2023-06-27 PROCEDURE — 85025 CBC WITH DIFFERENTIAL: ICD-10-PCS | Mod: ,,, | Performed by: CLINICAL MEDICAL LABORATORY

## 2023-06-27 PROCEDURE — 80048 BASIC METABOLIC PANEL: ICD-10-PCS | Mod: ,,, | Performed by: CLINICAL MEDICAL LABORATORY

## 2023-06-27 PROCEDURE — 3074F SYST BP LT 130 MM HG: CPT | Mod: CPTII,,, | Performed by: INTERNAL MEDICINE

## 2023-06-27 PROCEDURE — 81003 URINALYSIS AUTO W/O SCOPE: CPT | Mod: QW,,, | Performed by: CLINICAL MEDICAL LABORATORY

## 2023-06-27 PROCEDURE — 80048 BASIC METABOLIC PNL TOTAL CA: CPT | Mod: ,,, | Performed by: CLINICAL MEDICAL LABORATORY

## 2023-06-28 ENCOUNTER — TELEPHONE (OUTPATIENT)
Dept: FAMILY MEDICINE | Facility: CLINIC | Age: 63
End: 2023-06-28
Payer: MEDICAID

## 2023-06-28 LAB
BASOPHILS # BLD AUTO: 0.05 K/UL (ref 0–0.2)
BASOPHILS NFR BLD AUTO: 0.7 % (ref 0–1)
DIFFERENTIAL METHOD BLD: ABNORMAL
EOSINOPHIL # BLD AUTO: 0.18 K/UL (ref 0–0.5)
EOSINOPHIL NFR BLD AUTO: 2.5 % (ref 1–4)
ERYTHROCYTE [DISTWIDTH] IN BLOOD BY AUTOMATED COUNT: 14.8 % (ref 11.5–14.5)
HCT VFR BLD AUTO: 31 % (ref 38–47)
HGB BLD-MCNC: 9.2 G/DL (ref 12–16)
IMM GRANULOCYTES # BLD AUTO: 0.02 K/UL (ref 0–0.04)
IMM GRANULOCYTES NFR BLD: 0.3 % (ref 0–0.4)
LYMPHOCYTES # BLD AUTO: 2.41 K/UL (ref 1–4.8)
LYMPHOCYTES NFR BLD AUTO: 33.1 % (ref 27–41)
MCH RBC QN AUTO: 27.8 PG (ref 27–31)
MCHC RBC AUTO-ENTMCNC: 29.7 G/DL (ref 32–36)
MCV RBC AUTO: 93.7 FL (ref 80–96)
MONOCYTES # BLD AUTO: 0.41 K/UL (ref 0–0.8)
MONOCYTES NFR BLD AUTO: 5.6 % (ref 2–6)
MPC BLD CALC-MCNC: 10.4 FL (ref 9.4–12.4)
NEUTROPHILS # BLD AUTO: 4.2 K/UL (ref 1.8–7.7)
NEUTROPHILS NFR BLD AUTO: 57.8 % (ref 53–65)
NRBC # BLD AUTO: 0 X10E3/UL
NRBC, AUTO (.00): 0 %
PLATELET # BLD AUTO: 269 K/UL (ref 150–400)
RBC # BLD AUTO: 3.31 M/UL (ref 4.2–5.4)
WBC # BLD AUTO: 7.27 K/UL (ref 4.5–11)

## 2023-06-28 NOTE — TELEPHONE ENCOUNTER
----- Message from Wander Adamson MD sent at 6/28/2023 12:02 PM CDT -----  Need to see Monday   Abnormal x-ray of the hip     1324 Call made to pt regarding above message; pt is scheduled to come in on Monday; pt has no further questions at this time

## 2023-07-03 ENCOUNTER — OFFICE VISIT (OUTPATIENT)
Dept: FAMILY MEDICINE | Facility: CLINIC | Age: 63
End: 2023-07-03
Payer: MEDICAID

## 2023-07-03 VITALS
HEART RATE: 76 BPM | TEMPERATURE: 97 F | SYSTOLIC BLOOD PRESSURE: 146 MMHG | HEIGHT: 61 IN | BODY MASS INDEX: 28.01 KG/M2 | WEIGHT: 148.38 LBS | RESPIRATION RATE: 18 BRPM | OXYGEN SATURATION: 97 % | DIASTOLIC BLOOD PRESSURE: 87 MMHG

## 2023-07-03 DIAGNOSIS — M87.9 OSTEONECROSIS: Primary | ICD-10-CM

## 2023-07-03 DIAGNOSIS — G89.4 CHRONIC PAIN SYNDROME: ICD-10-CM

## 2023-07-03 PROCEDURE — 1160F PR REVIEW ALL MEDS BY PRESCRIBER/CLIN PHARMACIST DOCUMENTED: ICD-10-PCS | Mod: CPTII,,, | Performed by: INTERNAL MEDICINE

## 2023-07-03 PROCEDURE — 3008F BODY MASS INDEX DOCD: CPT | Mod: CPTII,,, | Performed by: INTERNAL MEDICINE

## 2023-07-03 PROCEDURE — 3079F PR MOST RECENT DIASTOLIC BLOOD PRESSURE 80-89 MM HG: ICD-10-PCS | Mod: CPTII,,, | Performed by: INTERNAL MEDICINE

## 2023-07-03 PROCEDURE — 1160F RVW MEDS BY RX/DR IN RCRD: CPT | Mod: CPTII,,, | Performed by: INTERNAL MEDICINE

## 2023-07-03 PROCEDURE — 1159F MED LIST DOCD IN RCRD: CPT | Mod: CPTII,,, | Performed by: INTERNAL MEDICINE

## 2023-07-03 PROCEDURE — 3077F SYST BP >= 140 MM HG: CPT | Mod: CPTII,,, | Performed by: INTERNAL MEDICINE

## 2023-07-03 PROCEDURE — 99213 OFFICE O/P EST LOW 20 MIN: CPT | Mod: ,,, | Performed by: INTERNAL MEDICINE

## 2023-07-03 PROCEDURE — 4010F PR ACE/ARB THEARPY RXD/TAKEN: ICD-10-PCS | Mod: CPTII,,, | Performed by: INTERNAL MEDICINE

## 2023-07-03 PROCEDURE — 3008F PR BODY MASS INDEX (BMI) DOCUMENTED: ICD-10-PCS | Mod: CPTII,,, | Performed by: INTERNAL MEDICINE

## 2023-07-03 PROCEDURE — 3079F DIAST BP 80-89 MM HG: CPT | Mod: CPTII,,, | Performed by: INTERNAL MEDICINE

## 2023-07-03 PROCEDURE — 3077F PR MOST RECENT SYSTOLIC BLOOD PRESSURE >= 140 MM HG: ICD-10-PCS | Mod: CPTII,,, | Performed by: INTERNAL MEDICINE

## 2023-07-03 PROCEDURE — 4010F ACE/ARB THERAPY RXD/TAKEN: CPT | Mod: CPTII,,, | Performed by: INTERNAL MEDICINE

## 2023-07-03 PROCEDURE — 1159F PR MEDICATION LIST DOCUMENTED IN MEDICAL RECORD: ICD-10-PCS | Mod: CPTII,,, | Performed by: INTERNAL MEDICINE

## 2023-07-03 PROCEDURE — 99213 PR OFFICE/OUTPT VISIT, EST, LEVL III, 20-29 MIN: ICD-10-PCS | Mod: ,,, | Performed by: INTERNAL MEDICINE

## 2023-07-04 PROBLEM — L84 SKIN CALLUS: Status: ACTIVE | Noted: 2023-07-04

## 2023-07-04 PROBLEM — Z85.828 HISTORY OF SKIN CANCER: Status: ACTIVE | Noted: 2023-07-04

## 2023-07-04 PROBLEM — R10.9 SIDE PAIN: Status: ACTIVE | Noted: 2023-07-04

## 2023-07-04 RX ORDER — DULOXETIN HYDROCHLORIDE 30 MG/1
30 CAPSULE, DELAYED RELEASE ORAL DAILY
Qty: 30 CAPSULE | Refills: 0 | Status: SHIPPED | OUTPATIENT
Start: 2023-07-04

## 2023-07-05 NOTE — PROGRESS NOTES
Subjective:       Patient ID: Lesli Escobar is a 63 y.o. female.    Chief Complaint: Nasal Congestion, Cough, and Flank Pain (Right )  63-year-old female presents with moderate to severe diffuse abdominal pain.  She is history of skin cancer and she has chronic diabetes.  States that her abdominal pain has been getting worse.  She also has mild dysuria.    Refer to dermatology because of history of skin cancer.  In regards to the diabetes will check a BNP.  In regards to the abdominal pain check a UA CNS she also states that her sinus hurting left side as if her kidneys are hurting going to order a renal ultrasound KUB.  And also order a CT scan of the abdomen pelvis with contrast.  I recommend over-the-counter Tylenol p.r.n. pain for now.    .    Current Medications:    Current Outpatient Medications:     albuterol (PROVENTIL) 2.5 mg /3 mL (0.083 %) nebulizer solution, albuterol sulfate 2.5 mg/3 mL (0.083 %) solution for nebulization  ONE BY NEBULIZER 4 TIMES A DAY AS NEEDED SHORTNESS OF BREATH wheezing, Disp: , Rfl:     albuterol (PROVENTIL/VENTOLIN HFA) 90 mcg/actuation inhaler, Ventolin HFA 90 mcg/actuation aerosol inhaler  Inhale 2 puffs every 4 hours by inhalation route as needed., Disp: , Rfl:     albuterol-ipratropium (DUO-NEB) 2.5 mg-0.5 mg/3 mL nebulizer solution, 3 mLs., Disp: , Rfl:     ALPRAZolam (XANAX) 1 MG tablet, TAKE 1 TABLET BY MOUTH ONCE DAILY AS NEEDED FOR ANXIETY AVOID ALCOHOL CAUSES DROWSINESS, Disp: , Rfl:     amitriptyline (ELAVIL) 75 MG tablet, Take 1 tablet (75 mg total) by mouth every evening., Disp: 90 tablet, Rfl: 3    aspirin (ECOTRIN) 81 MG EC tablet, Take 81 mg by mouth., Disp: , Rfl:     atorvastatin (LIPITOR) 80 MG tablet, Take 80 mg by mouth every evening., Disp: , Rfl:     budesonide-formoterol 160-4.5 mcg (SYMBICORT) 160-4.5 mcg/actuation HFAA, budesonide-formoterol  mcg-4.5 mcg/actuation aerosol inhaler  INHALE 2 PUFFS BY MOUTH TWICE DAILY AS DIRECTED (RINSE MOUTH  AFTER USE), Disp: , Rfl:     budesonide-formoterol 80-4.5 mcg (SYMBICORT) 80-4.5 mcg/actuation HFAA, budesonide-formoterol HFA 80 mcg-4.5 mcg/actuation aerosol inhaler  INHALE 2 PUFFS BY MOUTH TWICE DAILY, Disp: , Rfl:     carvediloL (COREG) 6.25 MG tablet, , Disp: , Rfl:     cefdinir (OMNICEF) 300 MG capsule, TAKE ONE CAPSULE BY MOUTH TWICE DAILY FOR 10 DAYS, Disp: , Rfl:     ergocalciferol (ERGOCALCIFEROL) 50,000 unit Cap, Take 50,000 Units by mouth every 7 days., Disp: , Rfl:     ferrous sulfate (FEOSOL) 325 mg (65 mg iron) Tab tablet, ferrous sulfate 325 mg (65 mg iron) tablet  Take 1 tablet twice a day by oral route for 30 days., Disp: , Rfl:     fluticasone propionate (FLONASE) 50 mcg/actuation nasal spray, INHALE 1 SPRAY IN EACH NOSTRIL ONCE DAILY FOR 30 DAYS.. (SHAKE BEFORE USE), Disp: , Rfl:     furosemide (LASIX) 40 MG tablet, furosemide 40 mg tablet  TAKE 1 TABLET BY MOUTH ONCE DAILY FOR FLUID, Disp: , Rfl:     gabapentin (NEURONTIN) 300 MG capsule, Take 300 mg by mouth 3 (three) times daily., Disp: , Rfl:     glimepiride (AMARYL) 4 MG tablet, 1 tablet with breakfast or the first main meal of the day, Disp: , Rfl:     leflunomide (ARAVA) 10 MG Tab, Take 10 mg by mouth., Disp: , Rfl:     lisinopriL 10 MG tablet, Take 10 mg by mouth once daily., Disp: , Rfl:     loratadine (CLARITIN) 10 mg tablet, TAKE 1 TABLET BY MOUTH ONCE DAILY FOR allergies, Disp: , Rfl:     losartan (COZAAR) 25 MG tablet, TAKE 1 TABLET BY MOUTH AT BEDTIME FOR HIGH BLOOD PRESSURE, Disp: , Rfl:     metFORMIN (GLUCOPHAGE) 500 MG tablet, Take 500 mg by mouth 2 (two) times daily with meals., Disp: , Rfl:     ONETOUCH DELICA PLUS LANCET 33 gauge Misc, , Disp: , Rfl:     ONETOUCH VERIO REFLECT METER Misc, , Disp: , Rfl:     ONETOUCH VERIO TEST STRIPS Strp, , Disp: , Rfl:     oxyCODONE-acetaminophen (PERCOCET)  mg per tablet, 1 tablet as needed, Disp: , Rfl:     oxyCODONE-acetaminophen (PERCOCET)  mg per tablet, Take 1 tablet by  "mouth 4 (four) times daily as needed., Disp: , Rfl:     predniSONE (DELTASONE) 10 MG tablet, prednisone 10 mg tablet  3 TABS EVERY A.M. X3DAYS THEN 2 TABS EVERY A.M. X3DAYS THEN 1 TAB EVERY MORNING X3DAY THEN 1 TAB EVERY MORNING X3DAY THEN STOP "TAKE WITH FOOD", Disp: , Rfl:     predniSONE (DELTASONE) 2.5 MG tablet, 1/2 tab, Disp: , Rfl:     predniSONE (DELTASONE) 5 MG tablet, Take 5 mg by mouth., Disp: , Rfl:     rosuvastatin (CRESTOR) 5 MG tablet, Crestor 5 mg tablet  Take 1 tablet every day by oral route at bedtime for 30 days., Disp: , Rfl:     sofosbuvir-velpatasvir 400-100 mg Tab, sofosbuvir 400 mg-velpatasvir 100 mg tablet  TAKE ONE TABLET BY MOUTH DAILY, Disp: , Rfl:     tiZANidine (ZANAFLEX) 4 MG tablet, 1 tablet as needed, Disp: , Rfl:     warfarin (COUMADIN) 7.5 MG tablet, warfarin 7.5 mg tablet  TAKE 1 TABLET BY MOUTH EVERY NIGHT AT BEDTIME, Disp: , Rfl:     albuterol (PROVENTIL) 2.5 mg /3 mL (0.083 %) nebulizer solution, ONE BY NEBULIZER 4 TIMES A DAY AS NEEDED SHORTNESS OF BREATH wheezing, Disp: , Rfl:     budesonide-formoterol 80-4.5 mcg (SYMBICORT) 80-4.5 mcg/actuation HFAA, Inhale 2 puffs into the lungs 2 (two) times daily., Disp: , Rfl:     dexamethasone (DECADRON) 4 mg/mL injection, dexamethasone sodium phosphate 4 mg/mL injection solution  1 CC IM TIMES 1, Disp: , Rfl:     digoxin (LANOXIN) 125 mcg tablet, digoxin 125 mcg (0.125 mg) tablet  TAKE 2 TABLETS BY MOUTH EVERY DAY, Disp: , Rfl:     doxycycline (VIBRA-TABS) 100 MG tablet, doxycycline hyclate 100 mg tablet  Take 1 tablet twice a day by oral route for 10 days., Disp: , Rfl:     enoxaparin (LOVENOX) 80 mg/0.8 mL Syrg, enoxaparin 80 mg/0.8 mL subcutaneous syringe  80MG UNDER THE SKIN EVERY 12 HOURS FOR 3 DAYS, Disp: , Rfl:     guaiFENesin-codeine 100-10 mg/5 ml (TUSSI-ORGANIDIN NR)  mg/5 mL syrup, codeine 10 mg-guaifenesin 100 mg/5 mL oral liquid  TAKE TWO TEASPOONSFULL (10ml) BY MOUTH 4 TIMES A DAY FOR 15 DAYS FOR COUGH AND CONGESTION " ".. may cause drowsiness / no alcohol / no driving, Disp: , Rfl:     hydroCHLOROthiazide (HYDRODIURIL) 12.5 MG Tab, Take 12.5 mg by mouth once daily., Disp: , Rfl:     HYDROcodone-acetaminophen (NORCO)  mg per tablet, hydrocodone 10 mg-acetaminophen 325 mg tablet  Take 1 tablet every 6 hours by oral route as needed for 7 days., Disp: , Rfl:     hydrOXYchloroQUINE (PLAQUENIL) 200 mg tablet, Take 200 mg by mouth 2 (two) times daily., Disp: , Rfl:     warfarin (COUMADIN) 10 MG tablet, warfarin 10 mg tablet  Take 1 tablet orally once on MON, WED, FRI., Disp: , Rfl:            Review of Systems   Constitutional:  Negative for appetite change, fatigue and fever.   Respiratory:  Positive for cough. Negative for shortness of breath.    Cardiovascular:  Negative for chest pain.   Gastrointestinal:  Negative for abdominal pain and constipation.   Endocrine: Negative for polydipsia, polyphagia and polyuria.   Genitourinary:  Positive for flank pain. Negative for difficulty urinating, frequency and hot flashes.   Allergic/Immunologic: Negative for environmental allergies.   Neurological:  Negative for dizziness and light-headedness.   Psychiatric/Behavioral:  Negative for agitation.               Vitals:    06/27/23 1418   BP: 116/80   BP Location: Right arm   Patient Position: Sitting   BP Method: Large (Automatic)   Pulse: 93   Resp: 18   Temp: 97.6 °F (36.4 °C)   TempSrc: Temporal   SpO2: 98%   Weight: 68.8 kg (151 lb 9.6 oz)   Height: 5' 1" (1.549 m)        Physical Exam  Vitals and nursing note reviewed.   Constitutional:       Appearance: Normal appearance.   Cardiovascular:      Rate and Rhythm: Normal rate and regular rhythm.      Pulses: Normal pulses.      Heart sounds: Normal heart sounds.   Pulmonary:      Effort: Pulmonary effort is normal.      Breath sounds: Normal breath sounds.   Abdominal:      General: Abdomen is flat. Bowel sounds are normal.      Palpations: Abdomen is soft.   Musculoskeletal:         " General: Normal range of motion.   Skin:     General: Skin is warm and dry.   Neurological:      General: No focal deficit present.      Mental Status: She is alert and oriented to person, place, and time. Mental status is at baseline.         Last Labs:     Office Visit on 06/27/2023   Component Date Value    Sodium 06/27/2023 142     Potassium 06/27/2023 3.5     Chloride 06/27/2023 110 (H)     CO2 06/27/2023 23     Anion Gap 06/27/2023 13     Glucose 06/27/2023 85     BUN 06/27/2023 13     Creatinine 06/27/2023 0.84     BUN/Creatinine Ratio 06/27/2023 15     Calcium 06/27/2023 9.7     eGFR 06/27/2023 79     Color, UA 06/27/2023 Light-Yellow     Clarity, UA 06/27/2023 Clear     pH, UA 06/27/2023 5.5     Leukocytes, UA 06/27/2023 Negative     Nitrites, UA 06/27/2023 Negative     Protein, UA 06/27/2023 Negative     Glucose, UA 06/27/2023 Normal     Ketones, UA 06/27/2023 Negative     Urobilinogen, UA 06/27/2023 Normal     Bilirubin, UA 06/27/2023 Negative     Blood, UA 06/27/2023 Negative     Specific Gravity, UA 06/27/2023 1.018     WBC 06/27/2023 7.27     RBC 06/27/2023 3.31 (L)     Hemoglobin 06/27/2023 9.2 (L)     Hematocrit 06/27/2023 31.0 (L)     MCV 06/27/2023 93.7     MCH 06/27/2023 27.8     MCHC 06/27/2023 29.7 (L)     RDW 06/27/2023 14.8 (H)     Platelet Count 06/27/2023 269     MPV 06/27/2023 10.4     Neutrophils % 06/27/2023 57.8     Lymphocytes % 06/27/2023 33.1     Monocytes % 06/27/2023 5.6     Eosinophils % 06/27/2023 2.5     Basophils % 06/27/2023 0.7     Immature Granulocytes % 06/27/2023 0.3     nRBC, Auto 06/27/2023 0.0     Neutrophils, Abs 06/27/2023 4.20     Lymphocytes, Absolute 06/27/2023 2.41     Monocytes, Absolute 06/27/2023 0.41     Eosinophils, Absolute 06/27/2023 0.18     Basophils, Absolute 06/27/2023 0.05     Immature Granulocytes, A* 06/27/2023 0.02     nRBC, Absolute 06/27/2023 0.00     Diff Type 06/27/2023 Auto        Last Imaging:  X-Ray [facility]  Narrative: EXAMINATION:  XR  KUB    CLINICAL HISTORY:  Unspecified abdominal pain    COMPARISON:  [facility]UB April 2, 2019    TECHNIQUE:  Frontal views of the abdomen.    FINDINGS:  Nonspecific bowel gas pattern.  Surgical clips project over the right upper quadrant of the abdomen.  IVC filter noted at the L2-3 level.  Prior right hip arthroplasty.  Chronic sclerosis of the left femoral head suggestive of sequela of osteonecrosis.  Prior sternotomy/cardiac valve repair.  Impression: No acute findings.  Other/detailed findings as above.    Point of Service: [facility]    Electronically signed by: He Ramsey  Date:    06/27/2023  Time:    15:28         **Labs and x-rays personally reviewed by me    ** reviewed      Objective:        Assessment:       1. Abdominal pain, unspecified abdominal location  Basic Metabolic Panel    CBC Auto Differential    Urinalysis, Reflex to Urine Culture    X-Ray KUB    Basic Metabolic Panel    CBC Auto Differential    Urinalysis, Reflex to Urine Culture    CT Abdomen Pelvis With Contrast      2. Side pain  US Kidney      3. Skin callus  Ambulatory referral/consult to Dermatology      4. Type 2 diabetes mellitus without complication, without long-term current use of insulin        5. History of skin cancer             Plan:         [unfilled]

## 2023-07-06 PROBLEM — M87.9 OSTEONECROSIS: Status: ACTIVE | Noted: 2023-07-06

## 2023-07-06 NOTE — PROGRESS NOTES
Subjective:       Patient ID: Lesli Escobar is a 63 y.o. female.    Chief Complaint: Results (Abnl  x ray ) and Flank Pain (Right near ribs )  Patient seen and evaluated.  Patient does have osteonecrosis of the left hip.  Patient seen and evaluated.  Patient does have osteonecrosis of the left hip.  Patient seen and evaluated.  Patient does have osteonecrosis of the left hip.  Patient does have chronic pain syndrome.  I will start Cymbalta 30 mg 1 p.o. q.day for chronic muscle skeletal pain.  She hurts in her hips her back and her knees.  Flank Pain  Pertinent negatives include no abdominal pain, chest pain or fever.   .    Current Medications:    Current Outpatient Medications:     albuterol (PROVENTIL) 2.5 mg /3 mL (0.083 %) nebulizer solution, albuterol sulfate 2.5 mg/3 mL (0.083 %) solution for nebulization  ONE BY NEBULIZER 4 TIMES A DAY AS NEEDED SHORTNESS OF BREATH wheezing, Disp: , Rfl:     albuterol (PROVENTIL) 2.5 mg /3 mL (0.083 %) nebulizer solution, ONE BY NEBULIZER 4 TIMES A DAY AS NEEDED SHORTNESS OF BREATH wheezing, Disp: , Rfl:     albuterol (PROVENTIL/VENTOLIN HFA) 90 mcg/actuation inhaler, Ventolin HFA 90 mcg/actuation aerosol inhaler  Inhale 2 puffs every 4 hours by inhalation route as needed., Disp: , Rfl:     albuterol-ipratropium (DUO-NEB) 2.5 mg-0.5 mg/3 mL nebulizer solution, 3 mLs., Disp: , Rfl:     ALPRAZolam (XANAX) 1 MG tablet, TAKE 1 TABLET BY MOUTH ONCE DAILY AS NEEDED FOR ANXIETY AVOID ALCOHOL CAUSES DROWSINESS, Disp: , Rfl:     amitriptyline (ELAVIL) 75 MG tablet, Take 1 tablet (75 mg total) by mouth every evening., Disp: 90 tablet, Rfl: 3    aspirin (ECOTRIN) 81 MG EC tablet, Take 81 mg by mouth., Disp: , Rfl:     atorvastatin (LIPITOR) 80 MG tablet, Take 80 mg by mouth every evening., Disp: , Rfl:     budesonide-formoterol 160-4.5 mcg (SYMBICORT) 160-4.5 mcg/actuation HFAA, budesonide-formoterol  mcg-4.5 mcg/actuation aerosol inhaler  INHALE 2 PUFFS BY MOUTH TWICE DAILY  AS DIRECTED (RINSE MOUTH AFTER USE), Disp: , Rfl:     budesonide-formoterol 80-4.5 mcg (SYMBICORT) 80-4.5 mcg/actuation HFAA, budesonide-formoterol HFA 80 mcg-4.5 mcg/actuation aerosol inhaler  INHALE 2 PUFFS BY MOUTH TWICE DAILY, Disp: , Rfl:     budesonide-formoterol 80-4.5 mcg (SYMBICORT) 80-4.5 mcg/actuation HFAA, Inhale 2 puffs into the lungs 2 (two) times daily., Disp: , Rfl:     carvediloL (COREG) 6.25 MG tablet, , Disp: , Rfl:     cefdinir (OMNICEF) 300 MG capsule, TAKE ONE CAPSULE BY MOUTH TWICE DAILY FOR 10 DAYS, Disp: , Rfl:     dexamethasone (DECADRON) 4 mg/mL injection, dexamethasone sodium phosphate 4 mg/mL injection solution  1 CC IM TIMES 1, Disp: , Rfl:     ergocalciferol (ERGOCALCIFEROL) 50,000 unit Cap, Take 50,000 Units by mouth every 7 days., Disp: , Rfl:     ferrous sulfate (FEOSOL) 325 mg (65 mg iron) Tab tablet, ferrous sulfate 325 mg (65 mg iron) tablet  Take 1 tablet twice a day by oral route for 30 days., Disp: , Rfl:     fluticasone propionate (FLONASE) 50 mcg/actuation nasal spray, INHALE 1 SPRAY IN EACH NOSTRIL ONCE DAILY FOR 30 DAYS.. (SHAKE BEFORE USE), Disp: , Rfl:     furosemide (LASIX) 40 MG tablet, furosemide 40 mg tablet  TAKE 1 TABLET BY MOUTH ONCE DAILY FOR FLUID, Disp: , Rfl:     gabapentin (NEURONTIN) 300 MG capsule, Take 300 mg by mouth 3 (three) times daily., Disp: , Rfl:     glimepiride (AMARYL) 4 MG tablet, 1 tablet with breakfast or the first main meal of the day, Disp: , Rfl:     hydroCHLOROthiazide (HYDRODIURIL) 12.5 MG Tab, Take 12.5 mg by mouth once daily., Disp: , Rfl:     leflunomide (ARAVA) 10 MG Tab, Take 10 mg by mouth., Disp: , Rfl:     lisinopriL 10 MG tablet, Take 10 mg by mouth once daily., Disp: , Rfl:     loratadine (CLARITIN) 10 mg tablet, TAKE 1 TABLET BY MOUTH ONCE DAILY FOR allergies, Disp: , Rfl:     losartan (COZAAR) 25 MG tablet, TAKE 1 TABLET BY MOUTH AT BEDTIME FOR HIGH BLOOD PRESSURE, Disp: , Rfl:     metFORMIN (GLUCOPHAGE) 500 MG tablet, Take  "500 mg by mouth 2 (two) times daily with meals., Disp: , Rfl:     ONETOUCH DELICA PLUS LANCET 33 gauge Misc, , Disp: , Rfl:     ONETOUCH VERIO REFLECT METER Misc, , Disp: , Rfl:     ONETOUCH VERIO TEST STRIPS Strp, , Disp: , Rfl:     oxyCODONE-acetaminophen (PERCOCET)  mg per tablet, 1 tablet as needed, Disp: , Rfl:     oxyCODONE-acetaminophen (PERCOCET)  mg per tablet, Take 1 tablet by mouth 4 (four) times daily as needed., Disp: , Rfl:     predniSONE (DELTASONE) 10 MG tablet, prednisone 10 mg tablet  3 TABS EVERY A.M. X3DAYS THEN 2 TABS EVERY A.M. X3DAYS THEN 1 TAB EVERY MORNING X3DAY THEN 1 TAB EVERY MORNING X3DAY THEN STOP "TAKE WITH FOOD", Disp: , Rfl:     predniSONE (DELTASONE) 2.5 MG tablet, 1/2 tab, Disp: , Rfl:     predniSONE (DELTASONE) 5 MG tablet, Take 5 mg by mouth., Disp: , Rfl:     rosuvastatin (CRESTOR) 5 MG tablet, Crestor 5 mg tablet  Take 1 tablet every day by oral route at bedtime for 30 days., Disp: , Rfl:     sofosbuvir-velpatasvir 400-100 mg Tab, sofosbuvir 400 mg-velpatasvir 100 mg tablet  TAKE ONE TABLET BY MOUTH DAILY, Disp: , Rfl:     tiZANidine (ZANAFLEX) 4 MG tablet, 1 tablet as needed, Disp: , Rfl:     warfarin (COUMADIN) 7.5 MG tablet, warfarin 7.5 mg tablet  TAKE 1 TABLET BY MOUTH EVERY NIGHT AT BEDTIME, Disp: , Rfl:     digoxin (LANOXIN) 125 mcg tablet, digoxin 125 mcg (0.125 mg) tablet  TAKE 2 TABLETS BY MOUTH EVERY DAY, Disp: , Rfl:     doxycycline (VIBRA-TABS) 100 MG tablet, doxycycline hyclate 100 mg tablet  Take 1 tablet twice a day by oral route for 10 days., Disp: , Rfl:     DULoxetine (CYMBALTA) 30 MG capsule, Take 1 capsule (30 mg total) by mouth once daily., Disp: 30 capsule, Rfl: 0    enoxaparin (LOVENOX) 80 mg/0.8 mL Syrg, enoxaparin 80 mg/0.8 mL subcutaneous syringe  80MG UNDER THE SKIN EVERY 12 HOURS FOR 3 DAYS, Disp: , Rfl:     guaiFENesin-codeine 100-10 mg/5 ml (TUSSI-ORGANIDIN NR)  mg/5 mL syrup, codeine 10 mg-guaifenesin 100 mg/5 mL oral liquid  " "TAKE TWO TEASPOONSFULL (10ml) BY MOUTH 4 TIMES A DAY FOR 15 DAYS FOR COUGH AND CONGESTION .. may cause drowsiness / no alcohol / no driving, Disp: , Rfl:     HYDROcodone-acetaminophen (NORCO)  mg per tablet, hydrocodone 10 mg-acetaminophen 325 mg tablet  Take 1 tablet every 6 hours by oral route as needed for 7 days., Disp: , Rfl:     hydrOXYchloroQUINE (PLAQUENIL) 200 mg tablet, Take 200 mg by mouth 2 (two) times daily., Disp: , Rfl:     warfarin (COUMADIN) 10 MG tablet, warfarin 10 mg tablet  Take 1 tablet orally once on MON, WED, FRI., Disp: , Rfl:            Review of Systems   Constitutional:  Negative for appetite change, fatigue and fever.   Respiratory:  Negative for shortness of breath.    Cardiovascular:  Negative for chest pain.   Gastrointestinal:  Negative for abdominal pain and constipation.   Endocrine: Negative for polydipsia, polyphagia and polyuria.   Genitourinary:  Positive for flank pain. Negative for difficulty urinating, frequency and hot flashes.   Musculoskeletal:  Positive for arthralgias.   Allergic/Immunologic: Negative for environmental allergies.   Neurological:  Negative for dizziness and light-headedness.   Psychiatric/Behavioral:  Negative for agitation.               Vitals:    07/03/23 1118 07/03/23 1119   BP: (!) 158/91 (!) 146/87   BP Location: Right arm Left arm   Patient Position: Sitting    BP Method: Large (Automatic)    Pulse: 76    Resp: 18    Temp: 97.3 °F (36.3 °C)    TempSrc: Temporal    SpO2: 97%    Weight: 67.3 kg (148 lb 6.4 oz)    Height: 5' 1" (1.549 m)         Physical Exam  Vitals and nursing note reviewed.   Constitutional:       Appearance: Normal appearance.   Cardiovascular:      Rate and Rhythm: Normal rate and regular rhythm.      Pulses: Normal pulses.      Heart sounds: Normal heart sounds.   Pulmonary:      Effort: Pulmonary effort is normal.      Breath sounds: Normal breath sounds.   Abdominal:      General: Abdomen is flat. Bowel sounds are " normal.      Palpations: Abdomen is soft.   Musculoskeletal:         General: Normal range of motion.   Skin:     General: Skin is warm and dry.   Neurological:      General: No focal deficit present.      Mental Status: She is alert and oriented to person, place, and time. Mental status is at baseline.         Last Labs:     Office Visit on 06/27/2023   Component Date Value    Sodium 06/27/2023 142     Potassium 06/27/2023 3.5     Chloride 06/27/2023 110 (H)     CO2 06/27/2023 23     Anion Gap 06/27/2023 13     Glucose 06/27/2023 85     BUN 06/27/2023 13     Creatinine 06/27/2023 0.84     BUN/Creatinine Ratio 06/27/2023 15     Calcium 06/27/2023 9.7     eGFR 06/27/2023 79     Color, UA 06/27/2023 Light-Yellow     Clarity, UA 06/27/2023 Clear     pH, UA 06/27/2023 5.5     Leukocytes, UA 06/27/2023 Negative     Nitrites, UA 06/27/2023 Negative     Protein, UA 06/27/2023 Negative     Glucose, UA 06/27/2023 Normal     Ketones, UA 06/27/2023 Negative     Urobilinogen, UA 06/27/2023 Normal     Bilirubin, UA 06/27/2023 Negative     Blood, UA 06/27/2023 Negative     Specific Gravity, UA 06/27/2023 1.018     WBC 06/27/2023 7.27     RBC 06/27/2023 3.31 (L)     Hemoglobin 06/27/2023 9.2 (L)     Hematocrit 06/27/2023 31.0 (L)     MCV 06/27/2023 93.7     MCH 06/27/2023 27.8     MCHC 06/27/2023 29.7 (L)     RDW 06/27/2023 14.8 (H)     Platelet Count 06/27/2023 269     MPV 06/27/2023 10.4     Neutrophils % 06/27/2023 57.8     Lymphocytes % 06/27/2023 33.1     Monocytes % 06/27/2023 5.6     Eosinophils % 06/27/2023 2.5     Basophils % 06/27/2023 0.7     Immature Granulocytes % 06/27/2023 0.3     nRBC, Auto 06/27/2023 0.0     Neutrophils, Abs 06/27/2023 4.20     Lymphocytes, Absolute 06/27/2023 2.41     Monocytes, Absolute 06/27/2023 0.41     Eosinophils, Absolute 06/27/2023 0.18     Basophils, Absolute 06/27/2023 0.05     Immature Granulocytes, A* 06/27/2023 0.02     nRBC, Absolute 06/27/2023 0.00     Diff Type 06/27/2023 Auto         Last Imaging:  X-Ray [facility]  Narrative: EXAMINATION:  XR KUB    CLINICAL HISTORY:  Unspecified abdominal pain    COMPARISON:  KUB April 2, 2019    TECHNIQUE:  Frontal views of the abdomen.    FINDINGS:  Nonspecific bowel gas pattern.  Surgical clips project over the right upper quadrant of the abdomen.  IVC filter noted at the L2-3 level.  Prior right hip arthroplasty.  Chronic sclerosis of the left femoral head suggestive of sequela of osteonecrosis.  Prior sternotomy/cardiac valve repair.  Impression: No acute findings.  Other/detailed findings as above.    Point of Service: [facility]    Electronically signed by: He Ramsey  Date:    06/27/2023  Time:    15:28         **Labs and x-rays personally reviewed by me    ** reviewed      Objective:        Assessment:       1. Osteonecrosis        2. Chronic pain syndrome             Plan:         [unfilled]

## 2023-07-20 ENCOUNTER — HOSPITAL ENCOUNTER (EMERGENCY)
Facility: HOSPITAL | Age: 63
Discharge: LEFT BEFORE DISCHARGE | End: 2023-07-20
Payer: MEDICAID

## 2023-07-20 VITALS
RESPIRATION RATE: 14 BRPM | DIASTOLIC BLOOD PRESSURE: 73 MMHG | WEIGHT: 149 LBS | TEMPERATURE: 98 F | BODY MASS INDEX: 28.13 KG/M2 | SYSTOLIC BLOOD PRESSURE: 127 MMHG | HEART RATE: 87 BPM | HEIGHT: 61 IN | OXYGEN SATURATION: 95 %

## 2023-07-20 DIAGNOSIS — R07.9 CHEST PAIN: ICD-10-CM

## 2023-07-20 LAB
ALBUMIN SERPL BCP-MCNC: 3.4 G/DL (ref 3.5–5)
ALBUMIN/GLOB SERPL: 0.7 {RATIO}
ALP SERPL-CCNC: 179 U/L (ref 50–130)
ALT SERPL W P-5'-P-CCNC: 16 U/L (ref 13–56)
ANION GAP SERPL CALCULATED.3IONS-SCNC: 6 MMOL/L (ref 7–16)
AST SERPL W P-5'-P-CCNC: 16 U/L (ref 15–37)
BASOPHILS # BLD AUTO: 0.06 K/UL (ref 0–0.2)
BASOPHILS NFR BLD AUTO: 0.8 % (ref 0–1)
BILIRUB SERPL-MCNC: 0.3 MG/DL (ref ?–1.2)
BUN SERPL-MCNC: 17 MG/DL (ref 7–18)
BUN/CREAT SERPL: 18 (ref 6–20)
CALCIUM SERPL-MCNC: 9.1 MG/DL (ref 8.5–10.1)
CHLORIDE SERPL-SCNC: 102 MMOL/L (ref 98–107)
CO2 SERPL-SCNC: 35 MMOL/L (ref 21–32)
CREAT SERPL-MCNC: 0.93 MG/DL (ref 0.55–1.02)
DIFFERENTIAL METHOD BLD: ABNORMAL
EGFR (NO RACE VARIABLE) (RUSH/TITUS): 70 ML/MIN/1.73M2
EOSINOPHIL # BLD AUTO: 0.17 K/UL (ref 0–0.5)
EOSINOPHIL NFR BLD AUTO: 2.3 % (ref 1–4)
ERYTHROCYTE [DISTWIDTH] IN BLOOD BY AUTOMATED COUNT: 13.8 % (ref 11.5–14.5)
GLOBULIN SER-MCNC: 4.6 G/DL (ref 2–4)
GLUCOSE SERPL-MCNC: 105 MG/DL (ref 74–106)
HCT VFR BLD AUTO: 29.9 % (ref 38–47)
HGB BLD-MCNC: 9.2 G/DL (ref 12–16)
IMM GRANULOCYTES # BLD AUTO: 0.03 K/UL (ref 0–0.04)
IMM GRANULOCYTES NFR BLD: 0.4 % (ref 0–0.4)
LYMPHOCYTES # BLD AUTO: 2.71 K/UL (ref 1–4.8)
LYMPHOCYTES NFR BLD AUTO: 36.1 % (ref 27–41)
MCH RBC QN AUTO: 28.5 PG (ref 27–31)
MCHC RBC AUTO-ENTMCNC: 30.8 G/DL (ref 32–36)
MCV RBC AUTO: 92.6 FL (ref 80–96)
MONOCYTES # BLD AUTO: 0.39 K/UL (ref 0–0.8)
MONOCYTES NFR BLD AUTO: 5.2 % (ref 2–6)
MPC BLD CALC-MCNC: 10.2 FL (ref 9.4–12.4)
NEUTROPHILS # BLD AUTO: 4.15 K/UL (ref 1.8–7.7)
NEUTROPHILS NFR BLD AUTO: 55.2 % (ref 53–65)
NRBC # BLD AUTO: 0 X10E3/UL
NRBC, AUTO (.00): 0 %
NT-PROBNP SERPL-MCNC: 256 PG/ML (ref 1–125)
PLATELET # BLD AUTO: 257 K/UL (ref 150–400)
POTASSIUM SERPL-SCNC: 4 MMOL/L (ref 3.5–5.1)
PROT SERPL-MCNC: 8 G/DL (ref 6.4–8.2)
RBC # BLD AUTO: 3.23 M/UL (ref 4.2–5.4)
SODIUM SERPL-SCNC: 139 MMOL/L (ref 136–145)
TROPONIN I SERPL DL<=0.01 NG/ML-MCNC: 10.8 PG/ML
TROPONIN I SERPL DL<=0.01 NG/ML-MCNC: 11.6 PG/ML
WBC # BLD AUTO: 7.51 K/UL (ref 4.5–11)

## 2023-07-20 PROCEDURE — 63600175 PHARM REV CODE 636 W HCPCS: Performed by: NURSE PRACTITIONER

## 2023-07-20 PROCEDURE — 99284 PR EMERGENCY DEPT VISIT,LEVEL IV: ICD-10-PCS | Mod: ,,, | Performed by: NURSE PRACTITIONER

## 2023-07-20 PROCEDURE — 93010 ELECTROCARDIOGRAM REPORT: CPT | Mod: ,,, | Performed by: STUDENT IN AN ORGANIZED HEALTH CARE EDUCATION/TRAINING PROGRAM

## 2023-07-20 PROCEDURE — 80053 COMPREHEN METABOLIC PANEL: CPT | Performed by: NURSE PRACTITIONER

## 2023-07-20 PROCEDURE — 63600175 PHARM REV CODE 636 W HCPCS: Performed by: EMERGENCY MEDICINE

## 2023-07-20 PROCEDURE — 96374 THER/PROPH/DIAG INJ IV PUSH: CPT

## 2023-07-20 PROCEDURE — 83880 ASSAY OF NATRIURETIC PEPTIDE: CPT | Performed by: NURSE PRACTITIONER

## 2023-07-20 PROCEDURE — 84484 ASSAY OF TROPONIN QUANT: CPT | Performed by: NURSE PRACTITIONER

## 2023-07-20 PROCEDURE — 99284 EMERGENCY DEPT VISIT MOD MDM: CPT | Mod: 25

## 2023-07-20 PROCEDURE — 93010 EKG 12-LEAD: ICD-10-PCS | Mod: ,,, | Performed by: STUDENT IN AN ORGANIZED HEALTH CARE EDUCATION/TRAINING PROGRAM

## 2023-07-20 PROCEDURE — 93005 ELECTROCARDIOGRAM TRACING: CPT

## 2023-07-20 PROCEDURE — 85025 COMPLETE CBC W/AUTO DIFF WBC: CPT | Performed by: NURSE PRACTITIONER

## 2023-07-20 PROCEDURE — 99284 EMERGENCY DEPT VISIT MOD MDM: CPT | Mod: ,,, | Performed by: NURSE PRACTITIONER

## 2023-07-20 RX ORDER — MORPHINE SULFATE 4 MG/ML
4 INJECTION, SOLUTION INTRAMUSCULAR; INTRAVENOUS
Status: COMPLETED | OUTPATIENT
Start: 2023-07-20 | End: 2023-07-20

## 2023-07-20 RX ORDER — HEPARIN SODIUM (PORCINE) LOCK FLUSH IV SOLN 100 UNIT/ML 100 UNIT/ML
100 SOLUTION INTRAVENOUS ONCE
Status: COMPLETED | OUTPATIENT
Start: 2023-07-20 | End: 2023-07-20

## 2023-07-20 RX ADMIN — MORPHINE SULFATE 4 MG: 4 INJECTION, SOLUTION INTRAMUSCULAR; INTRAVENOUS at 07:07

## 2023-07-20 RX ADMIN — HEPARIN SODIUM (PORCINE) LOCK FLUSH IV SOLN 100 UNIT/ML 100 UNITS: 100 SOLUTION at 06:07

## 2023-07-20 NOTE — LETTER
Patient: Lesli Escobar  YOB: 1960  Date: 7/20/2023 Time: 8:42 PM  Location: [facility]    Leaving the Hospital Against Medical Advice    Chart #:[id number]    This will certify that I, the undersigned,    ______________________________________________________________________    A patient in the above named medical center, having requested discharge and removal from the medical [place] against the advice of my attending physician(s), hereby release [facility], its physicians, officers and employees, severally and individually, from any and all liability of any nature whatsoever for any injury or harm or complication of any kind that may result directly or indirectly, by reason of my terminating my stay as a patient at [facility] and my departure from [facility], and hereby waive any and all rights of action I may now have or later acquire as a result of my voluntary departure from [facility] and the termination of my stay as a patient therein.    This release is made with the full knowledge of the danger that may result from the action which I am taking.      Date:_______________________                         ___________________________                                                                                    Patient/Legal Representative    Witness:        ____________________________                          ___________________________  Nurse                                                                        Physician

## 2023-07-21 ENCOUNTER — TELEPHONE (OUTPATIENT)
Dept: EMERGENCY MEDICINE | Facility: HOSPITAL | Age: 63
End: 2023-07-21
Payer: MEDICAID

## 2023-07-21 NOTE — ED NOTES
Pt wants to leave before 2nd troponin resulted. Explain to pt the importance of staying. Pt still wants to leave. Pt left AMA. Form signed. NP aware. Pt stable.

## 2023-07-21 NOTE — ED PROVIDER NOTES
Encounter Date: 7/20/2023       History     Chief Complaint   Patient presents with    Chest Pain     Pt c/o chest pain that started today and has gotten worse.     63-year-old female presents to ED with complaint of chest pain.  Patient states chest pain started today approximately 2-3 hours ago and worsened over the course of 2-3 hours.  Patient reports pain to center of her chest and states that pain radiates into her left arm.  Reports nausea without vomiting.  Reports history of anxiety, cardiomyopathy, CVA, diabetes, hypertension, hyperlipidemia, rheumatoid arthritis, lupus.  Denies fever, chills, shortness of breathe.      Review of patient's allergies indicates:   Allergen Reactions    Prochlorperazine Other (See Comments)     Hallucination      Ketorolac Itching     Past Medical History:   Diagnosis Date    Anxiety     Cardiomyopathy     Carotid bruit     Chronic pain syndrome     Coronary artery disease     CVA (cerebral vascular accident)     Depressive disorder     Diabetes mellitus     Hyperlipidemia     Hypertension     Lumbosacral radiculopathy     Lumbosacral spondylolysis     Rheumatoid arthritis     Sacroiliitis     Systemic lupus erythematosus      Past Surgical History:   Procedure Laterality Date    APPENDECTOMY      BLOCK, NERVE, FEMORAL Right 4/6/2022    Procedure: BLOCK, NERVE, FEMORAL;  Surgeon: Artemio Lynn MD;  Location: [facility];  Service: Pain Management;  Laterality: Right;  Right Obturator/femoral NB    BLOCK, NERVE, OBTURATOR Right 4/6/2022    Procedure: BLOCK,NERVE,OBTURATOR;  Surgeon: Artemio Lynn MD;  Location: [facility];  Service: Pain Management;  Laterality: Right;    CARDIAC CATHETERIZATION      CHOLECYSTECTOMY      DILATION AND CURETTAGE OF UTERUS      HYSTERECTOMY      MITRAL VALVE REPLACEMENT      OOPHORECTOMY      right hip replacement Right     Right SI JI Right 12-9-2020, 12-2-2020    Dr Fisher    SELECTIVE INJECTION OF ANESTHETIC AGENT  AROUND LUMBAR SPINAL NERVE ROOT BY TRANSFORAMINAL APPROACH Right 07/08/2021    Procedure: BLOCK, SPINAL NERVE ROOT, LUMBAR, SELECTIVE, TRANSFORAMINAL APPROACH, Right L3-4, L4-5 transforaminal epidural steroid injection;  Surgeon: Esperanza Fisher MD;  Location: [facility];  Service: Pain Management;  Laterality: Right;    TUBAL LIGATION       Family History   Problem Relation Age of Onset    Stroke Mother     Lung cancer Mother     Stroke Father     Diabetes Sister     Hypertension Sister     Throat cancer Brother      Social History     Tobacco Use    Smoking status: Former     Passive exposure: Current    Smokeless tobacco: Never   Substance Use Topics    Alcohol use: Not Currently     Review of Systems   Constitutional:  Negative for chills and fever.   HENT:  Negative for sinus pressure and sinus pain.    Respiratory:  Negative for cough and shortness of breath.    Cardiovascular:  Positive for chest pain. Negative for palpitations.   Gastrointestinal:  Negative for diarrhea and nausea.   Endocrine: Negative for cold intolerance and heat intolerance.   Genitourinary:  Negative for dysuria and urgency.   Allergic/Immunologic: Negative for environmental allergies and food allergies.     Physical Exam     Initial Vitals [07/20/23 1704]   BP Pulse Resp Temp SpO2   121/83 98 19 97.5 °F (36.4 °C) 99 %      MAP       --         Physical Exam    Nursing note and vitals reviewed.  Constitutional: She appears well-developed and well-nourished.   HENT:   Head: Normocephalic and atraumatic.   Eyes: EOM are normal. Pupils are equal, round, and reactive to light.   Neck: Neck supple.   Normal range of motion.  Cardiovascular:  Normal rate and regular rhythm.           No murmur heard.  Pulmonary/Chest: She has no wheezes. She has no rhonchi.   Abdominal: Abdomen is soft. She exhibits no distension. There is no abdominal tenderness.   Musculoskeletal:         General: No tenderness or edema.      Cervical back: Normal  range of motion and neck supple.     Lymphadenopathy:     She has no cervical adenopathy.   Neurological: She is alert and oriented to person, place, and time. No cranial nerve deficit or sensory deficit.   Skin: Skin is warm and dry. Capillary refill takes less than 2 seconds.   Psychiatric: She has a normal mood and affect. Thought content normal.       Medical Screening Exam   See Full Note    ED Course   Procedures  Labs Reviewed   COMPREHENSIVE METABOLIC PANEL - Abnormal; Notable for the following components:       Result Value    CO2 35 (*)     Anion Gap 6 (*)     Albumin 3.4 (*)     Globulin 4.6 (*)     Alk Phos 179 (*)     All other components within normal limits   NT-PRO NATRIURETIC PEPTIDE - Abnormal; Notable for the following components:    ProBNP 256 (*)     All other components within normal limits   CBC WITH DIFFERENTIAL - Abnormal; Notable for the following components:    RBC 3.23 (*)     Hemoglobin 9.2 (*)     Hematocrit 29.9 (*)     MCHC 30.8 (*)     All other components within normal limits   TROPONIN I - Normal   TROPONIN I - Normal   CBC W/ AUTO DIFFERENTIAL    Narrative:     The following orders were created for panel order CBC auto differential.  Procedure                               Abnormality         Status                     ---------                               -----------         ------                     CBC with Differential[[id number]]        Abnormal            Final result                 Please view results for these tests on the individual orders.   EXTRA TUBES    Narrative:     The following orders were created for panel order EXTRA TUBES.  Procedure                               Abnormality         Status                     ---------                               -----------         ------                     Light Blue Top Hold[[id number]]                              In process                   Please view results for these tests on the individual orders.   LIGHT BLUE  TOP HOLD   EXTRA TUBES    Narrative:     The following orders were created for panel order EXTRA TUBES.  Procedure                               Abnormality         Status                     ---------                               -----------         ------                     Gold Top Hold[[id number]]                                    In process                   Please view results for these tests on the individual orders.   GOLD TOP HOLD          Imaging Results              X-Ray Chest AP Portable (Final result)  Result time 07/20/23 17:22:42      Final result by Mansoor Lopez II, MD (07/20/23 17:22:42)                   Impression:      No acute cardiopulmonary findings.      Electronically signed by: Mansoor Lopez  Date:    07/20/2023  Time:    17:22               Narrative:    EXAMINATION:  XR CHEST AP PORTABLE    CLINICAL HISTORY:  Chest Pain;    COMPARISON:  6 October 2021    TECHNIQUE:  XR CHEST AP PORTABLE    FINDINGS:  The heart and mediastinum are stable in size and configuration.  Right IJ Port-A-Cath is unchanged.  The pulmonary vascularity is normal in caliber.  No lung infiltrates, effusions, pneumothorax or other abnormality is demonstrated.                                       Medications   heparin lock flush (porcine) injection 100 Units (100 Units Intravenous Given 7/20/23 1832)   morphine injection 4 mg (4 mg Intravenous Given 7/20/23 1918)     Medical Decision Making:   Initial Assessment:   Chest pain  Differential Diagnosis:   Chest pain  ED Management:  Patient presented with chest pain and generalized body aches. Patient received workup for chest pain. Initial workup was unremarkable. Patient requested to leave; discussed with patient need for repeat troponin due to chest pain. Patient refused; left AMA. Patient received IV morphine for pain                       Clinical Impression:   Final diagnoses:  [R07.9] Chest pain        ED Disposition Condition    PERCY Daniels  GRANT Parker, [facility]  07/23/23 1712       Frances Parker, [facility]  07/26/23 1229

## 2023-08-10 ENCOUNTER — OFFICE VISIT (OUTPATIENT)
Dept: FAMILY MEDICINE | Facility: CLINIC | Age: 63
End: 2023-08-10
Payer: MEDICAID

## 2023-08-10 ENCOUNTER — HOSPITAL ENCOUNTER (EMERGENCY)
Facility: HOSPITAL | Age: 63
Discharge: HOME | End: 2023-08-10
Payer: MEDICAID

## 2023-08-10 VITALS
TEMPERATURE: 98 F | OXYGEN SATURATION: 99 % | SYSTOLIC BLOOD PRESSURE: 144 MMHG | DIASTOLIC BLOOD PRESSURE: 79 MMHG | WEIGHT: 141 LBS | RESPIRATION RATE: 16 BRPM | HEART RATE: 100 BPM | HEIGHT: 61 IN | BODY MASS INDEX: 26.62 KG/M2

## 2023-08-10 VITALS
BODY MASS INDEX: 26.77 KG/M2 | OXYGEN SATURATION: 97 % | HEIGHT: 61 IN | HEART RATE: 106 BPM | TEMPERATURE: 98 F | WEIGHT: 141.81 LBS | RESPIRATION RATE: 18 BRPM | DIASTOLIC BLOOD PRESSURE: 79 MMHG | SYSTOLIC BLOOD PRESSURE: 137 MMHG

## 2023-08-10 DIAGNOSIS — G89.29 CHRONIC BILATERAL LOW BACK PAIN WITH BILATERAL SCIATICA: Primary | ICD-10-CM

## 2023-08-10 DIAGNOSIS — M54.41 CHRONIC BILATERAL LOW BACK PAIN WITH BILATERAL SCIATICA: Primary | ICD-10-CM

## 2023-08-10 DIAGNOSIS — M54.41 CHRONIC RIGHT-SIDED LOW BACK PAIN WITH RIGHT-SIDED SCIATICA: Primary | ICD-10-CM

## 2023-08-10 DIAGNOSIS — E86.0 DEHYDRATION: ICD-10-CM

## 2023-08-10 DIAGNOSIS — G89.29 CHRONIC RIGHT-SIDED LOW BACK PAIN WITH RIGHT-SIDED SCIATICA: Primary | ICD-10-CM

## 2023-08-10 DIAGNOSIS — M54.42 CHRONIC BILATERAL LOW BACK PAIN WITH BILATERAL SCIATICA: Primary | ICD-10-CM

## 2023-08-10 DIAGNOSIS — K52.9 GASTROENTERITIS: ICD-10-CM

## 2023-08-10 DIAGNOSIS — M54.9 BACK PAIN: ICD-10-CM

## 2023-08-10 PROCEDURE — 99284 PR EMERGENCY DEPT VISIT,LEVEL IV: ICD-10-PCS | Mod: ,,, | Performed by: NURSE PRACTITIONER

## 2023-08-10 PROCEDURE — 63600175 PHARM REV CODE 636 W HCPCS: Performed by: NURSE PRACTITIONER

## 2023-08-10 PROCEDURE — 3008F BODY MASS INDEX DOCD: CPT | Mod: CPTII,,, | Performed by: INTERNAL MEDICINE

## 2023-08-10 PROCEDURE — 99284 EMERGENCY DEPT VISIT MOD MDM: CPT

## 2023-08-10 PROCEDURE — 3078F PR MOST RECENT DIASTOLIC BLOOD PRESSURE < 80 MM HG: ICD-10-PCS | Mod: CPTII,,, | Performed by: INTERNAL MEDICINE

## 2023-08-10 PROCEDURE — 99214 OFFICE O/P EST MOD 30 MIN: CPT | Mod: ,,, | Performed by: INTERNAL MEDICINE

## 2023-08-10 PROCEDURE — 1159F MED LIST DOCD IN RCRD: CPT | Mod: CPTII,,, | Performed by: INTERNAL MEDICINE

## 2023-08-10 PROCEDURE — 3075F SYST BP GE 130 - 139MM HG: CPT | Mod: CPTII,,, | Performed by: INTERNAL MEDICINE

## 2023-08-10 PROCEDURE — 99214 PR OFFICE/OUTPT VISIT, EST, LEVL IV, 30-39 MIN: ICD-10-PCS | Mod: ,,, | Performed by: INTERNAL MEDICINE

## 2023-08-10 PROCEDURE — 96372 THER/PROPH/DIAG INJ SC/IM: CPT | Performed by: NURSE PRACTITIONER

## 2023-08-10 PROCEDURE — 3075F PR MOST RECENT SYSTOLIC BLOOD PRESS GE 130-139MM HG: ICD-10-PCS | Mod: CPTII,,, | Performed by: INTERNAL MEDICINE

## 2023-08-10 PROCEDURE — 1160F RVW MEDS BY RX/DR IN RCRD: CPT | Mod: CPTII,,, | Performed by: INTERNAL MEDICINE

## 2023-08-10 PROCEDURE — 1160F PR REVIEW ALL MEDS BY PRESCRIBER/CLIN PHARMACIST DOCUMENTED: ICD-10-PCS | Mod: CPTII,,, | Performed by: INTERNAL MEDICINE

## 2023-08-10 PROCEDURE — 1159F PR MEDICATION LIST DOCUMENTED IN MEDICAL RECORD: ICD-10-PCS | Mod: CPTII,,, | Performed by: INTERNAL MEDICINE

## 2023-08-10 PROCEDURE — 3008F PR BODY MASS INDEX (BMI) DOCUMENTED: ICD-10-PCS | Mod: CPTII,,, | Performed by: INTERNAL MEDICINE

## 2023-08-10 PROCEDURE — 99284 EMERGENCY DEPT VISIT MOD MDM: CPT | Mod: ,,, | Performed by: NURSE PRACTITIONER

## 2023-08-10 PROCEDURE — 4010F PR ACE/ARB THEARPY RXD/TAKEN: ICD-10-PCS | Mod: CPTII,,, | Performed by: INTERNAL MEDICINE

## 2023-08-10 PROCEDURE — 3078F DIAST BP <80 MM HG: CPT | Mod: CPTII,,, | Performed by: INTERNAL MEDICINE

## 2023-08-10 PROCEDURE — 4010F ACE/ARB THERAPY RXD/TAKEN: CPT | Mod: CPTII,,, | Performed by: INTERNAL MEDICINE

## 2023-08-10 RX ORDER — CHLORHEXIDINE GLUCONATE ORAL RINSE 1.2 MG/ML
SOLUTION DENTAL
COMMUNITY
Start: 2023-08-09

## 2023-08-10 RX ORDER — ORPHENADRINE CITRATE 30 MG/ML
60 INJECTION INTRAMUSCULAR; INTRAVENOUS
Status: COMPLETED | OUTPATIENT
Start: 2023-08-10 | End: 2023-08-10

## 2023-08-10 RX ORDER — METOPROLOL SUCCINATE 25 MG/1
TABLET, EXTENDED RELEASE ORAL
COMMUNITY
End: 2024-02-20

## 2023-08-10 RX ORDER — TRIAMCINOLONE ACETONIDE 1 MG/G
OINTMENT TOPICAL
COMMUNITY
Start: 2023-08-09

## 2023-08-10 RX ADMIN — ORPHENADRINE CITRATE 60 MG: 60 INJECTION INTRAMUSCULAR; INTRAVENOUS at 06:08

## 2023-08-10 NOTE — ED TRIAGE NOTES
PATIENT PRESENTS TO ER WITH COMPLAIN T OF BACK PAIN. HAS A HX OF BACK PAIN CHRONIC IN [place]URE AND ALSO HAS A APPT WITH DR YING. SENT TO ER FROM DR WARE OFFICE FOR NEREYDA

## 2023-08-10 NOTE — PROGRESS NOTES
Pt referred to ER for severe back pain, dehydration, and gastroenteritis per Dr Adamson; 911 called and [facility]

## 2023-08-10 NOTE — ED PROVIDER NOTES
Encounter Date: 8/10/2023       History     Chief Complaint   Patient presents with    Chronic Pain     PATIENT PRESENTS TO ER WITH COMPLAINT OF BACK PAIN. HAS A HX OF BACK PAIN CHRONIC IN NATURE AND ALSO HAS A APPT WITH DR YING. SENT TO ER FROM DR WARE OFFICE FOR EVAL          The history is provided by the patient. No  was used.     Review of patient's allergies indicates:   Allergen Reactions    Prochlorperazine Other (See Comments)     Hallucination      Ketorolac Itching     Past Medical History:   Diagnosis Date    Anxiety     Cardiomyopathy     Carotid bruit     Chronic pain syndrome     Coronary artery disease     CVA (cerebral vascular accident)     Depressive disorder     Diabetes mellitus     Hyperlipidemia     Hypertension     Lumbosacral radiculopathy     Lumbosacral spondylolysis     Rheumatoid arthritis     Sacroiliitis     Systemic lupus erythematosus      Past Surgical History:   Procedure Laterality Date    APPENDECTOMY      BLOCK, NERVE, FEMORAL Right 4/6/2022    Procedure: BLOCK, NERVE, FEMORAL;  Surgeon: Artemio Lynn MD;  Location: [facility];  Service: Pain Management;  Laterality: Right;  Right Obturator/femoral NB    BLOCK, NERVE, OBTURATOR Right 4/6/2022    Procedure: BLOCK,NERVE,OBTURATOR;  Surgeon: Artemio Lynn MD;  Location: [facility];  Service: Pain Management;  Laterality: Right;    CARDIAC CATHETERIZATION      CHOLECYSTECTOMY      DILATION AND CURETTAGE OF UTERUS      HYSTERECTOMY      MITRAL VALVE REPLACEMENT      OOPHORECTOMY      right hip replacement Right     Right SI JI Right 12-9-2020, 12-2-2020    Dr Fisher    SELECTIVE INJECTION OF ANESTHETIC AGENT AROUND LUMBAR SPINAL NERVE ROOT BY TRANSFORAMINAL APPROACH Right 07/08/2021    Procedure: BLOCK, SPINAL NERVE ROOT, LUMBAR, SELECTIVE, TRANSFORAMINAL APPROACH, Right L3-4, L4-5 transforaminal epidural steroid injection;  Surgeon: Esperanza Fisher MD;  Location: [facility];  Service: Pain Management;  Laterality: Right;    TUBAL LIGATION       Family History   Problem Relation Age of Onset    Stroke Mother     Lung cancer Mother     Stroke Father     Diabetes Sister     Hypertension Sister     Throat cancer Brother      Social History     Tobacco Use    Smoking status: Former     Passive exposure: Current    Smokeless tobacco: Never   Substance Use Topics    Alcohol use: Not Currently     Review of Systems   Constitutional:  Positive for activity change and fatigue.   Musculoskeletal:  Positive for back pain and myalgias.   All other systems reviewed and are negative.      Physical Exam     Initial Vitals [08/10/23 1559]   BP Pulse Resp Temp SpO2   (!) 144/79 100 16 98 °F (36.7 °C) 99 %      MAP       --         Physical Exam    Nursing note and vitals reviewed.  Constitutional: She appears well-developed and well-nourished.   HENT:   Head: Normocephalic.   Right Ear: External ear normal.   Left Ear: External ear normal.   Nose: Nose normal.   Mouth/Throat: Oropharynx is clear and moist.   Neck:   Normal range of motion.  Cardiovascular:  Normal rate, regular rhythm and intact distal pulses.           Pulmonary/Chest: Breath sounds normal.   Abdominal: Abdomen is soft. Bowel sounds are normal.   Musculoskeletal:         General: Normal range of motion.      Cervical back: Normal range of motion.     Neurological: She is alert and oriented to person, place, and time. She has normal strength. GCS score is 15. GCS eye subscore is 4. GCS verbal subscore is 5. GCS motor subscore is 6.   Skin: Skin is warm and dry. Capillary refill takes less than 2 seconds.   Psychiatric: She has a normal mood and affect. Her behavior is normal. Judgment and thought content normal.         Medical Screening Exam   See Full Note    ED Course   Procedures  Labs Reviewed - No data to display       Imaging Results    None          Medications   orphenadrine injection 60 mg (60 mg Intramuscular Given  8/10/23 1826)     Medical Decision Making:   Initial Assessment:   PATIENT PRESENTS TO ER WITH COMPLAIN T OF BACK PAIN. HAS A HX OF BACK PAIN CHRONIC IN [place]URE AND ALSO HAS A APPT WITH DR YING. SENT TO ER FROM DR ADAMSON OFFICE FOR EVAL        Differential Diagnosis:   CHRONIC BACK PAIN   ED Management:  NORFLEX 60MG IM    DC HOME [facility] DX OF CHRONIC BACK PAIN                           Clinical Impression:   Final diagnoses:  [M54.9] Back pain  [M54.41, G89.29] Chronic right-sided low back pain with right-sided sciatica (Primary)        ED Disposition Condition    Discharge Stable          ED Prescriptions    None       Follow-up Information       Follow up With Specialties Details Why Contact Info    Wander Adamson MD Internal Medicine, Family Medicine Schedule an appointment as soon as possible for a visit   [address] [place] [id number]  [phone]               Marivel Mauricio, NATHANAEL  09/11/23 0954       Marivel Mauricio, RAGHAVENDRA  09/11/23 0954

## 2023-08-14 PROBLEM — G89.29 CHRONIC BILATERAL LOW BACK PAIN WITH BILATERAL SCIATICA: Status: ACTIVE | Noted: 2023-08-14

## 2023-08-14 PROBLEM — K52.9 GASTROENTERITIS: Status: ACTIVE | Noted: 2023-08-14

## 2023-08-14 PROBLEM — M54.42 CHRONIC BILATERAL LOW BACK PAIN WITH BILATERAL SCIATICA: Status: ACTIVE | Noted: 2023-08-14

## 2023-08-14 PROBLEM — M54.41 CHRONIC BILATERAL LOW BACK PAIN WITH BILATERAL SCIATICA: Status: ACTIVE | Noted: 2023-08-14

## 2023-08-14 PROBLEM — E86.0 DEHYDRATION: Status: ACTIVE | Noted: 2023-08-14

## 2023-08-14 NOTE — PROGRESS NOTES
Subjective:       Patient ID: Lesli Escobar is a 63 y.o. female.    Chief Complaint: Back Pain  Patient seen and evaluated patient complains of severe excruciating low back pain radiating down to her buttocks area patient rates the pain a 10 out 10.  Patient also complains of nausea vomiting and diarrhea.  On exam is clear that she is dehydrated.  When palpating the lower back patient's tears.  They stated she had been eating or drinking much over the last 4-5 days.    Patient does have gastroenteritis she is dehydrated closely membranes are very dry.  Plan is to refer her to the emergency room.  I have also discussed the dictation/discussed with the patient regarding potential hospitalization.    Back Pain  Pertinent negatives include no abdominal pain, chest pain or fever.     .    Current Medications:    Current Outpatient Medications:     albuterol (PROVENTIL) 2.5 mg /3 mL (0.083 %) nebulizer solution, albuterol sulfate 2.5 mg/3 mL (0.083 %) solution for nebulization  ONE BY NEBULIZER 4 TIMES A DAY AS NEEDED SHORTNESS OF BREATH wheezing, Disp: , Rfl:     albuterol (PROVENTIL) 2.5 mg /3 mL (0.083 %) nebulizer solution, ONE BY NEBULIZER 4 TIMES A DAY AS NEEDED SHORTNESS OF BREATH wheezing, Disp: , Rfl:     albuterol (PROVENTIL/VENTOLIN HFA) 90 mcg/actuation inhaler, Ventolin HFA 90 mcg/actuation aerosol inhaler  Inhale 2 puffs every 4 hours by inhalation route as needed., Disp: , Rfl:     albuterol-ipratropium (DUO-NEB) 2.5 mg-0.5 mg/3 mL nebulizer solution, 3 mLs., Disp: , Rfl:     ALPRAZolam (XANAX) 1 MG tablet, TAKE 1 TABLET BY MOUTH ONCE DAILY AS NEEDED FOR ANXIETY AVOID ALCOHOL CAUSES DROWSINESS, Disp: , Rfl:     amitriptyline (ELAVIL) 75 MG tablet, Take 1 tablet (75 mg total) by mouth every evening., Disp: 90 tablet, Rfl: 3    aspirin (ECOTRIN) 81 MG EC tablet, Take 81 mg by mouth., Disp: , Rfl:     atorvastatin (LIPITOR) 80 MG tablet, Take 80 mg by mouth every evening., Disp: , Rfl:      budesonide-formoterol 160-4.5 mcg (SYMBICORT) 160-4.5 mcg/actuation HFAA, budesonide-formoterol  mcg-4.5 mcg/actuation aerosol inhaler  INHALE 2 PUFFS BY MOUTH TWICE DAILY AS DIRECTED (RINSE MOUTH AFTER USE), Disp: , Rfl:     budesonide-formoterol 80-4.5 mcg (SYMBICORT) 80-4.5 mcg/actuation HFAA, budesonide-formoterol HFA 80 mcg-4.5 mcg/actuation aerosol inhaler  INHALE 2 PUFFS BY MOUTH TWICE DAILY, Disp: , Rfl:     budesonide-formoterol 80-4.5 mcg (SYMBICORT) 80-4.5 mcg/actuation HFAA, Inhale 2 puffs into the lungs 2 (two) times daily., Disp: , Rfl:     carvediloL (COREG) 6.25 MG tablet, , Disp: , Rfl:     cefdinir (OMNICEF) 300 MG capsule, TAKE ONE CAPSULE BY MOUTH TWICE DAILY FOR 10 DAYS, Disp: , Rfl:     chlorhexidine (PERIDEX) 0.12 % solution, SMARTSIG:Ounce(s) By Mouth Twice Daily, Disp: , Rfl:     DULoxetine (CYMBALTA) 30 MG capsule, Take 1 capsule (30 mg total) by mouth once daily., Disp: 30 capsule, Rfl: 0    ergocalciferol (ERGOCALCIFEROL) 50,000 unit Cap, Take 50,000 Units by mouth every 7 days., Disp: , Rfl:     ferrous sulfate (FEOSOL) 325 mg (65 mg iron) Tab tablet, ferrous sulfate 325 mg (65 mg iron) tablet  Take 1 tablet twice a day by oral route for 30 days., Disp: , Rfl:     fluticasone propionate (FLONASE) 50 mcg/actuation nasal spray, INHALE 1 SPRAY IN EACH NOSTRIL ONCE DAILY FOR 30 DAYS.. (SHAKE BEFORE USE), Disp: , Rfl:     furosemide (LASIX) 40 MG tablet, furosemide 40 mg tablet  TAKE 1 TABLET BY MOUTH ONCE DAILY FOR FLUID, Disp: , Rfl:     gabapentin (NEURONTIN) 300 MG capsule, Take 300 mg by mouth 3 (three) times daily., Disp: , Rfl:     glimepiride (AMARYL) 4 MG tablet, 1 tablet with breakfast or the first main meal of the day, Disp: , Rfl:     HYDROcodone-acetaminophen (NORCO)  mg per tablet, hydrocodone 10 mg-acetaminophen 325 mg tablet  Take 1 tablet every 6 hours by oral route as needed for 7 days., Disp: , Rfl:     leflunomide (ARAVA) 10 MG Tab, Take 10 mg by mouth., Disp:  ", Rfl:     lisinopriL 10 MG tablet, Take 10 mg by mouth once daily., Disp: , Rfl:     loratadine (CLARITIN) 10 mg tablet, TAKE 1 TABLET BY MOUTH ONCE DAILY FOR allergies, Disp: , Rfl:     losartan (COZAAR) 25 MG tablet, TAKE 1 TABLET BY MOUTH AT BEDTIME FOR HIGH BLOOD PRESSURE, Disp: , Rfl:     metFORMIN (GLUCOPHAGE) 500 MG tablet, Take 500 mg by mouth 2 (two) times daily with meals., Disp: , Rfl:     metoprolol succinate (TOPROL-XL) 25 MG 24 hr tablet, TAKE 1 TABLETS BY MOUTH ONCE DAILY FOR BLOOD PRESSURE, Disp: , Rfl:     ONETOUCH DELICA PLUS LANCET 33 gauge Misc, , Disp: , Rfl:     ONETOUCH VERIO REFLECT METER Misc, , Disp: , Rfl:     ONETOUCH VERIO TEST STRIPS Strp, , Disp: , Rfl:     oxyCODONE-acetaminophen (PERCOCET)  mg per tablet, 1 tablet as needed, Disp: , Rfl:     oxyCODONE-acetaminophen (PERCOCET)  mg per tablet, Take 1 tablet by mouth 4 (four) times daily as needed., Disp: , Rfl:     predniSONE (DELTASONE) 10 MG tablet, prednisone 10 mg tablet  3 TABS EVERY A.M. X3DAYS THEN 2 TABS EVERY A.M. X3DAYS THEN 1 TAB EVERY MORNING X3DAY THEN 1 TAB EVERY MORNING X3DAY THEN STOP "TAKE WITH FOOD", Disp: , Rfl:     predniSONE (DELTASONE) 2.5 MG tablet, 1/2 tab, Disp: , Rfl:     predniSONE (DELTASONE) 5 MG tablet, Take 5 mg by mouth., Disp: , Rfl:     rosuvastatin (CRESTOR) 5 MG tablet, Crestor 5 mg tablet  Take 1 tablet every day by oral route at bedtime for 30 days., Disp: , Rfl:     sofosbuvir-velpatasvir 400-100 mg Tab, sofosbuvir 400 mg-velpatasvir 100 mg tablet  TAKE ONE TABLET BY MOUTH DAILY, Disp: , Rfl:     tiZANidine (ZANAFLEX) 4 MG tablet, 1 tablet as needed, Disp: , Rfl:     triamcinolone acetonide 0.1% (KENALOG) 0.1 % ointment, SMARTSIG:sparingly Topical Twice Daily, Disp: , Rfl:     warfarin (COUMADIN) 7.5 MG tablet, warfarin 7.5 mg tablet  TAKE 1 TABLET BY MOUTH EVERY NIGHT AT BEDTIME, Disp: , Rfl:     dexamethasone (DECADRON) 4 mg/mL injection, dexamethasone sodium phosphate 4 mg/mL " injection solution  1 CC IM TIMES 1, Disp: , Rfl:     digoxin (LANOXIN) 125 mcg tablet, digoxin 125 mcg (0.125 mg) tablet  TAKE 2 TABLETS BY MOUTH EVERY DAY, Disp: , Rfl:     doxycycline (VIBRA-TABS) 100 MG tablet, doxycycline hyclate 100 mg tablet  Take 1 tablet twice a day by oral route for 10 days., Disp: , Rfl:     enoxaparin (LOVENOX) 80 mg/0.8 mL Syrg, enoxaparin 80 mg/0.8 mL subcutaneous syringe  80MG UNDER THE SKIN EVERY 12 HOURS FOR 3 DAYS, Disp: , Rfl:     guaiFENesin-codeine 100-10 mg/5 ml (TUSSI-ORGANIDIN NR)  mg/5 mL syrup, codeine 10 mg-guaifenesin 100 mg/5 mL oral liquid  TAKE TWO TEASPOONSFULL (10ml) BY MOUTH 4 TIMES A DAY FOR 15 DAYS FOR COUGH AND CONGESTION .. may cause drowsiness / no alcohol / no driving, Disp: , Rfl:     hydroCHLOROthiazide (HYDRODIURIL) 12.5 MG Tab, Take 12.5 mg by mouth once daily., Disp: , Rfl:     hydrOXYchloroQUINE (PLAQUENIL) 200 mg tablet, Take 200 mg by mouth 2 (two) times daily., Disp: , Rfl:     warfarin (COUMADIN) 10 MG tablet, warfarin 10 mg tablet  Take 1 tablet orally once on MON, WED, FRI., Disp: , Rfl:            Review of Systems   Constitutional:  Negative for appetite change, fatigue and fever.   Respiratory:  Negative for shortness of breath.    Cardiovascular:  Negative for chest pain.   Gastrointestinal:  Negative for abdominal pain and constipation.   Endocrine: Negative for polydipsia, polyphagia and polyuria.   Genitourinary:  Negative for difficulty urinating, frequency and hot flashes.   Musculoskeletal:  Positive for back pain.   Allergic/Immunologic: Negative for environmental allergies.   Neurological:  Negative for dizziness and light-headedness.   Psychiatric/Behavioral:  Negative for agitation.                 Vitals:    08/10/23 1356   BP: 137/79   BP Location: Right arm   Patient Position: Sitting   BP Method: Large (Automatic)   Pulse: 106   Resp: 18   Temp: 98.3 °F (36.8 °C)   TempSrc: Temporal   SpO2: 97%   Weight: 64.3 kg (141 lb 12.8  "oz)   Height: 5' 1" (1.549 m)        Physical Exam  Vitals and nursing note reviewed.   Constitutional:       Appearance: Normal appearance.   Cardiovascular:      Rate and Rhythm: Normal rate and regular rhythm.      Pulses: Normal pulses.      Heart sounds: Normal heart sounds.   Pulmonary:      Effort: Pulmonary effort is normal.      Breath sounds: Normal breath sounds.   Abdominal:      General: Abdomen is flat. Bowel sounds are normal.      Palpations: Abdomen is soft.   Musculoskeletal:         General: Normal range of motion.   Skin:     General: Skin is warm and dry.   Neurological:      General: No focal deficit present.      Mental Status: She is alert and oriented to person, place, and time. Mental status is at baseline.           Last Labs:     Admission on 07/20/2023, Discharged on 07/20/2023   Component Date Value    Sodium 07/20/2023 139     Potassium 07/20/2023 4.0     Chloride 07/20/2023 102     CO2 07/20/2023 35 (H)     Anion Gap 07/20/2023 6 (L)     Glucose 07/20/2023 105     BUN 07/20/2023 17     Creatinine 07/20/2023 0.93     BUN/Creatinine Ratio 07/20/2023 18     Calcium 07/20/2023 9.1     Total Protein 07/20/2023 8.0     Albumin 07/20/2023 3.4 (L)     Globulin 07/20/2023 4.6 (H)     A/G Ratio 07/20/2023 0.7     Bilirubin, Total 07/20/2023 0.3     Alk Phos 07/20/2023 179 (H)     ALT 07/20/2023 16     AST 07/20/2023 16     eGFR 07/20/2023 70     Troponin I High Sensitiv* 07/20/2023 10.8     Troponin I High Sensitiv* 07/20/2023 11.6     ProBNP 07/20/2023 256 (H)     WBC 07/20/2023 7.51     RBC 07/20/2023 3.23 (L)     Hemoglobin 07/20/2023 9.2 (L)     Hematocrit 07/20/2023 29.9 (L)     MCV 07/20/2023 92.6     MCH 07/20/2023 28.5     MCHC 07/20/2023 30.8 (L)     RDW 07/20/2023 13.8     Platelet Count 07/20/2023 257     MPV 07/20/2023 10.2     Neutrophils % 07/20/2023 55.2     Lymphocytes % 07/20/2023 36.1     Monocytes % 07/20/2023 5.2     Eosinophils % 07/20/2023 2.3     Basophils % 07/20/2023 " 0.8     Immature Granulocytes % 07/20/2023 0.4     nRBC, Auto 07/20/2023 0.0     Neutrophils, Abs 07/20/2023 4.15     Lymphocytes, Absolute 07/20/2023 2.71     Monocytes, Absolute 07/20/2023 0.39     Eosinophils, Absolute 07/20/2023 0.17     Basophils, Absolute 07/20/2023 0.06     Immature Granulocytes, A* 07/20/2023 0.03     nRBC, Absolute 07/20/2023 0.00     Diff Type 07/20/2023 Auto        Last Imaging:  X-Ray Chest AP Portable  Narrative: EXAMINATION:  XR CHEST AP PORTABLE    CLINICAL HISTORY:  Chest Pain;    COMPARISON:  6 October 2021    TECHNIQUE:  XR CHEST AP PORTABLE    FINDINGS:  The heart and mediastinum are stable in size and configuration.  Right IJ Port-A-Cath is unchanged.  The pulmonary vascularity is normal in caliber.  No lung infiltrates, effusions, pneumothorax or other abnormality is demonstrated.  Impression: No acute cardiopulmonary findings.    Electronically signed by: Mansoor Lopez  Date:    07/20/2023  Time:    17:22         **Labs and x-rays personally reviewed by me    ** reviewed      Objective:        Assessment:       1. Chronic bilateral low back pain with bilateral sciatica        2. Gastroenteritis        3. Dehydration             Plan:         [unfilled]

## 2023-12-12 ENCOUNTER — OFFICE VISIT (OUTPATIENT)
Dept: FAMILY MEDICINE | Facility: CLINIC | Age: 63
End: 2023-12-12
Payer: MEDICAID

## 2023-12-12 VITALS
RESPIRATION RATE: 18 BRPM | HEART RATE: 64 BPM | OXYGEN SATURATION: 98 % | BODY MASS INDEX: 27.97 KG/M2 | HEIGHT: 61 IN | TEMPERATURE: 98 F | DIASTOLIC BLOOD PRESSURE: 76 MMHG | SYSTOLIC BLOOD PRESSURE: 139 MMHG | WEIGHT: 148.13 LBS

## 2023-12-12 DIAGNOSIS — K57.90 DIVERTICULOSIS: ICD-10-CM

## 2023-12-12 DIAGNOSIS — K57.92 DIVERTICULITIS: ICD-10-CM

## 2023-12-12 DIAGNOSIS — Z98.890 S/P MVR (MITRAL VALVE REPAIR): ICD-10-CM

## 2023-12-12 DIAGNOSIS — C34.90 MALIGNANT NEOPLASM OF LUNG, UNSPECIFIED LATERALITY, UNSPECIFIED PART OF LUNG: ICD-10-CM

## 2023-12-12 DIAGNOSIS — D64.9 ANEMIA, UNSPECIFIED TYPE: Primary | ICD-10-CM

## 2023-12-12 LAB
ANISOCYTOSIS BLD QL SMEAR: ABNORMAL
BASOPHILS # BLD AUTO: 0.03 K/UL (ref 0–0.2)
BASOPHILS NFR BLD AUTO: 0.4 % (ref 0–1)
BURR CELLS BLD QL SMEAR: ABNORMAL
DIFFERENTIAL METHOD BLD: ABNORMAL
EOSINOPHIL # BLD AUTO: 0 K/UL (ref 0–0.5)
EOSINOPHIL NFR BLD AUTO: 0 % (ref 1–4)
ERYTHROCYTE [DISTWIDTH] IN BLOOD BY AUTOMATED COUNT: 19.3 % (ref 11.5–14.5)
HCT VFR BLD AUTO: 31.8 % (ref 38–47)
HGB BLD-MCNC: 9.2 G/DL (ref 12–16)
IMM GRANULOCYTES # BLD AUTO: 0.03 K/UL (ref 0–0.04)
IMM GRANULOCYTES NFR BLD: 0.4 % (ref 0–0.4)
LYMPHOCYTES # BLD AUTO: 0.54 K/UL (ref 1–4.8)
LYMPHOCYTES NFR BLD AUTO: 8.1 % (ref 27–41)
MACROCYTES BLD QL SMEAR: ABNORMAL
MCH RBC QN AUTO: 31.2 PG (ref 27–31)
MCHC RBC AUTO-ENTMCNC: 28.9 G/DL (ref 32–36)
MCV RBC AUTO: 107.8 FL (ref 80–96)
MONOCYTES # BLD AUTO: 0.77 K/UL (ref 0–0.8)
MONOCYTES NFR BLD AUTO: 11.5 % (ref 2–6)
MPC BLD CALC-MCNC: 10.7 FL (ref 9.4–12.4)
NEUTROPHILS # BLD AUTO: 5.33 K/UL (ref 1.8–7.7)
NEUTROPHILS NFR BLD AUTO: 79.6 % (ref 53–65)
NRBC # BLD AUTO: 0 X10E3/UL
NRBC, AUTO (.00): 0 %
OVALOCYTES BLD QL SMEAR: ABNORMAL
PLATELET # BLD AUTO: 169 K/UL (ref 150–400)
PLATELET MORPHOLOGY: ABNORMAL
POLYCHROMASIA BLD QL SMEAR: ABNORMAL
RBC # BLD AUTO: 2.95 M/UL (ref 4.2–5.4)
WBC # BLD AUTO: 6.7 K/UL (ref 4.5–11)

## 2023-12-12 PROCEDURE — 3008F BODY MASS INDEX DOCD: CPT | Mod: CPTII,,, | Performed by: INTERNAL MEDICINE

## 2023-12-12 PROCEDURE — 85025 COMPLETE CBC W/AUTO DIFF WBC: CPT | Mod: ,,, | Performed by: CLINICAL MEDICAL LABORATORY

## 2023-12-12 PROCEDURE — 3075F SYST BP GE 130 - 139MM HG: CPT | Mod: CPTII,,, | Performed by: INTERNAL MEDICINE

## 2023-12-12 PROCEDURE — 1160F RVW MEDS BY RX/DR IN RCRD: CPT | Mod: CPTII,,, | Performed by: INTERNAL MEDICINE

## 2023-12-12 PROCEDURE — 3078F DIAST BP <80 MM HG: CPT | Mod: CPTII,,, | Performed by: INTERNAL MEDICINE

## 2023-12-12 PROCEDURE — 1159F MED LIST DOCD IN RCRD: CPT | Mod: CPTII,,, | Performed by: INTERNAL MEDICINE

## 2023-12-12 PROCEDURE — 99214 OFFICE O/P EST MOD 30 MIN: CPT | Mod: ,,, | Performed by: INTERNAL MEDICINE

## 2023-12-12 RX ORDER — DILTIAZEM HYDROCHLORIDE 120 MG/1
120 CAPSULE, COATED, EXTENDED RELEASE ORAL
COMMUNITY
Start: 2023-09-05

## 2023-12-12 RX ORDER — METHYLPREDNISOLONE 4 MG/1
TABLET ORAL
COMMUNITY
Start: 2023-09-09

## 2023-12-12 RX ORDER — FOLIC ACID 1 MG/1
1000 TABLET ORAL
COMMUNITY
Start: 2023-12-01

## 2023-12-12 RX ORDER — MAGNESIUM 64 MG (MAGNESIUM CHLORIDE) TABLET,DELAYED RELEASE
128
COMMUNITY
Start: 2023-11-30

## 2023-12-12 RX ORDER — LIDOCAINE 50 MG/G
1 PATCH TOPICAL
COMMUNITY
Start: 2023-12-01

## 2023-12-12 RX ORDER — DIPHENHYDRAMINE HCL 25 MG
CAPSULE ORAL
COMMUNITY
Start: 2023-08-11

## 2023-12-12 RX ORDER — SILVER SULFADIAZINE 10 G/1000G
CREAM TOPICAL 2 TIMES DAILY
Qty: 85 G | Refills: 3 | Status: SHIPPED | OUTPATIENT
Start: 2023-12-12

## 2023-12-15 PROBLEM — C34.90 MALIGNANT NEOPLASM OF LUNG: Status: ACTIVE | Noted: 2023-12-15

## 2023-12-15 PROBLEM — K57.92 DIVERTICULITIS: Status: ACTIVE | Noted: 2023-12-15

## 2023-12-15 PROBLEM — Z98.890 S/P MVR (MITRAL VALVE REPAIR): Status: ACTIVE | Noted: 2023-12-15

## 2023-12-15 PROBLEM — K57.90 DIVERTICULOSIS: Status: ACTIVE | Noted: 2023-12-15

## 2023-12-16 NOTE — PROGRESS NOTES
Subjective:       Patient ID: Lesli Escobar is a 63 y.o. female.    Chief Complaint: Flank Pain (Wound on left side )  Patient presents with multiple complaints.  Stated she was recently diagnosed with a 2nd diagnosed lung cancer diverticulosis and anemia.  She has requesting a GI evaluation for chronic worsening abdominal pain.  She also has a history of mitral valve repair.  She has a partial-thickness wound to her right hip which is healing is slightly erythematous.  Because of the mitral valve pair placement also see a cardiologist Dr. Ibrahim   Partial-thickness wound to the right hip I am going to order Silvadene ointment b.i.d. to the wound.  Because a history of anemia of going to check a CBC today.  Flank Pain  Pertinent negatives include no abdominal pain, chest pain or fever.     .    Current Medications:    Current Outpatient Medications:     albuterol (PROVENTIL) 2.5 mg /3 mL (0.083 %) nebulizer solution, albuterol sulfate 2.5 mg/3 mL (0.083 %) solution for nebulization  ONE BY NEBULIZER 4 TIMES A DAY AS NEEDED SHORTNESS OF BREATH wheezing, Disp: , Rfl:     albuterol (PROVENTIL) 2.5 mg /3 mL (0.083 %) nebulizer solution, ONE BY NEBULIZER 4 TIMES A DAY AS NEEDED SHORTNESS OF BREATH wheezing, Disp: , Rfl:     albuterol (PROVENTIL/VENTOLIN HFA) 90 mcg/actuation inhaler, Ventolin HFA 90 mcg/actuation aerosol inhaler  Inhale 2 puffs every 4 hours by inhalation route as needed., Disp: , Rfl:     albuterol-ipratropium (DUO-NEB) 2.5 mg-0.5 mg/3 mL nebulizer solution, 3 mLs., Disp: , Rfl:     ALPRAZolam (XANAX) 1 MG tablet, TAKE 1 TABLET BY MOUTH ONCE DAILY AS NEEDED FOR ANXIETY AVOID ALCOHOL CAUSES DROWSINESS, Disp: , Rfl:     amitriptyline (ELAVIL) 75 MG tablet, Take 1 tablet (75 mg total) by mouth every evening., Disp: 90 tablet, Rfl: 3    ANTIFUNGAL, CLOTRIMAZOLE, 1 % cream, SMARTSIG:Topical Morning-Evening, Disp: , Rfl:     aspirin (ECOTRIN) 81 MG EC tablet, Take 81 mg by mouth., Disp: , Rfl:      atorvastatin (LIPITOR) 80 MG tablet, Take 80 mg by mouth every evening., Disp: , Rfl:     budesonide-formoterol 160-4.5 mcg (SYMBICORT) 160-4.5 mcg/actuation HFAA, budesonide-formoterol  mcg-4.5 mcg/actuation aerosol inhaler  INHALE 2 PUFFS BY MOUTH TWICE DAILY AS DIRECTED (RINSE MOUTH AFTER USE), Disp: , Rfl:     budesonide-formoterol 80-4.5 mcg (SYMBICORT) 80-4.5 mcg/actuation HFAA, budesonide-formoterol HFA 80 mcg-4.5 mcg/actuation aerosol inhaler  INHALE 2 PUFFS BY MOUTH TWICE DAILY, Disp: , Rfl:     budesonide-formoterol 80-4.5 mcg (SYMBICORT) 80-4.5 mcg/actuation HFAA, Inhale 2 puffs into the lungs 2 (two) times daily., Disp: , Rfl:     carvediloL (COREG) 6.25 MG tablet, , Disp: , Rfl:     cefdinir (OMNICEF) 300 MG capsule, TAKE ONE CAPSULE BY MOUTH TWICE DAILY FOR 10 DAYS, Disp: , Rfl:     chlorhexidine (PERIDEX) 0.12 % solution, SMARTSIG:Ounce(s) By Mouth Twice Daily, Disp: , Rfl:     dexamethasone (DECADRON) 4 mg/mL injection, dexamethasone sodium phosphate 4 mg/mL injection solution  1 CC IM TIMES 1, Disp: , Rfl:     diltiaZEM (CARDIZEM CD) 120 MG Cp24, Take 120 mg by mouth., Disp: , Rfl:     ergocalciferol (ERGOCALCIFEROL) 50,000 unit Cap, Take 50,000 Units by mouth every 7 days., Disp: , Rfl:     ferrous sulfate (FEOSOL) 325 mg (65 mg iron) Tab tablet, ferrous sulfate 325 mg (65 mg iron) tablet  Take 1 tablet twice a day by oral route for 30 days., Disp: , Rfl:     fluticasone propionate (FLONASE) 50 mcg/actuation nasal spray, INHALE 1 SPRAY IN EACH NOSTRIL ONCE DAILY FOR 30 DAYS.. (SHAKE BEFORE USE), Disp: , Rfl:     folic acid (FOLVITE) 1 MG tablet, Take 1,000 mcg by mouth., Disp: , Rfl:     furosemide (LASIX) 40 MG tablet, furosemide 40 mg tablet  TAKE 1 TABLET BY MOUTH ONCE DAILY FOR FLUID, Disp: , Rfl:     gabapentin (NEURONTIN) 300 MG capsule, Take 300 mg by mouth 3 (three) times daily., Disp: , Rfl:     glimepiride (AMARYL) 4 MG tablet, 1 tablet with breakfast or the first main meal of the  "day, Disp: , Rfl:     guaiFENesin-codeine 100-10 mg/5 ml (TUSSI-ORGANIDIN NR)  mg/5 mL syrup, codeine 10 mg-guaifenesin 100 mg/5 mL oral liquid  TAKE TWO TEASPOONSFULL (10ml) BY MOUTH 4 TIMES A DAY FOR 15 DAYS FOR COUGH AND CONGESTION .. may cause drowsiness / no alcohol / no driving, Disp: , Rfl:     leflunomide (ARAVA) 10 MG Tab, Take 10 mg by mouth., Disp: , Rfl:     LIDOcaine (LIDODERM) 5 %, 1 patch., Disp: , Rfl:     lisinopriL 10 MG tablet, Take 10 mg by mouth once daily., Disp: , Rfl:     loratadine (CLARITIN) 10 mg tablet, TAKE 1 TABLET BY MOUTH ONCE DAILY FOR allergies, Disp: , Rfl:     losartan (COZAAR) 25 MG tablet, TAKE 1 TABLET BY MOUTH AT BEDTIME FOR HIGH BLOOD PRESSURE, Disp: , Rfl:     MAG 64 64 mg TbEC, Take 128 mg by mouth., Disp: , Rfl:     metFORMIN (GLUCOPHAGE) 500 MG tablet, Take 500 mg by mouth 2 (two) times daily with meals., Disp: , Rfl:     methylPREDNISolone (MEDROL DOSEPACK) 4 mg tablet, Take by mouth., Disp: , Rfl:     metoprolol succinate (TOPROL-XL) 25 MG 24 hr tablet, TAKE 1 TABLETS BY MOUTH ONCE DAILY FOR BLOOD PRESSURE, Disp: , Rfl:     ONETOUCH DELICA PLUS LANCET 33 gauge Misc, , Disp: , Rfl:     ONETOUCH VERIO REFLECT METER Misc, , Disp: , Rfl:     ONETOUCH VERIO TEST STRIPS Strp, , Disp: , Rfl:     oxyCODONE-acetaminophen (PERCOCET)  mg per tablet, 1 tablet as needed, Disp: , Rfl:     oxyCODONE-acetaminophen (PERCOCET)  mg per tablet, Take 1 tablet by mouth 4 (four) times daily as needed., Disp: , Rfl:     predniSONE (DELTASONE) 10 MG tablet, prednisone 10 mg tablet  3 TABS EVERY A.M. X3DAYS THEN 2 TABS EVERY A.M. X3DAYS THEN 1 TAB EVERY MORNING X3DAY THEN 1 TAB EVERY MORNING X3DAY THEN STOP "TAKE WITH FOOD", Disp: , Rfl:     predniSONE (DELTASONE) 2.5 MG tablet, 1/2 tab, Disp: , Rfl:     predniSONE (DELTASONE) 5 MG tablet, Take 5 mg by mouth., Disp: , Rfl:     rosuvastatin (CRESTOR) 5 MG tablet, Crestor 5 mg tablet  Take 1 tablet every day by oral route at " bedtime for 30 days., Disp: , Rfl:     sofosbuvir-velpatasvir 400-100 mg Tab, sofosbuvir 400 mg-velpatasvir 100 mg tablet  TAKE ONE TABLET BY MOUTH DAILY, Disp: , Rfl:     tiZANidine (ZANAFLEX) 4 MG tablet, 1 tablet as needed, Disp: , Rfl:     triamcinolone acetonide 0.1% (KENALOG) 0.1 % ointment, SMARTSIG:sparingly Topical Twice Daily, Disp: , Rfl:     warfarin (COUMADIN) 10 MG tablet, warfarin 10 mg tablet  Take 1 tablet orally once on MON, WED, FRI., Disp: , Rfl:     warfarin (COUMADIN) 7.5 MG tablet, warfarin 7.5 mg tablet  TAKE 1 TABLET BY MOUTH EVERY NIGHT AT BEDTIME, Disp: , Rfl:     digoxin (LANOXIN) 125 mcg tablet, digoxin 125 mcg (0.125 mg) tablet  TAKE 2 TABLETS BY MOUTH EVERY DAY, Disp: , Rfl:     doxycycline (VIBRA-TABS) 100 MG tablet, doxycycline hyclate 100 mg tablet  Take 1 tablet twice a day by oral route for 10 days., Disp: , Rfl:     DULoxetine (CYMBALTA) 30 MG capsule, Take 1 capsule (30 mg total) by mouth once daily. (Patient not taking: Reported on 12/12/2023), Disp: 30 capsule, Rfl: 0    enoxaparin (LOVENOX) 80 mg/0.8 mL Syrg, enoxaparin 80 mg/0.8 mL subcutaneous syringe  80MG UNDER THE SKIN EVERY 12 HOURS FOR 3 DAYS, Disp: , Rfl:     hydroCHLOROthiazide (HYDRODIURIL) 12.5 MG Tab, Take 12.5 mg by mouth once daily., Disp: , Rfl:     HYDROcodone-acetaminophen (NORCO)  mg per tablet, hydrocodone 10 mg-acetaminophen 325 mg tablet  Take 1 tablet every 6 hours by oral route as needed for 7 days., Disp: , Rfl:     hydrOXYchloroQUINE (PLAQUENIL) 200 mg tablet, Take 200 mg by mouth 2 (two) times daily., Disp: , Rfl:     silver sulfADIAZINE 1% (SILVADENE) 1 % cream, Apply topically 2 (two) times daily., Disp: 85 g, Rfl: 3           Review of Systems   Constitutional:  Negative for appetite change, fatigue and fever.   Respiratory:  Negative for shortness of breath.    Cardiovascular:  Negative for chest pain.   Gastrointestinal:  Negative for abdominal pain and constipation.   Endocrine: Negative  "for polydipsia, polyphagia and polyuria.   Genitourinary:  Positive for flank pain. Negative for difficulty urinating, frequency and hot flashes.   Allergic/Immunologic: Negative for environmental allergies.   Neurological:  Negative for dizziness and light-headedness.   Psychiatric/Behavioral:  Negative for agitation.                 Vitals:    12/12/23 1337   BP: 139/76   BP Location: Right arm   Patient Position: Sitting   BP Method: Large (Automatic)   Pulse: 64   Resp: 18   Temp: 97.6 °F (36.4 °C)   TempSrc: Temporal   SpO2: 98%   Weight: 67.2 kg (148 lb 1.6 oz)   Height: 5' 1" (1.549 m)        Physical Exam  Vitals and nursing note reviewed.   Constitutional:       Appearance: Normal appearance.   HENT:      Head: Normocephalic and atraumatic.      Right Ear: Tympanic membrane, ear canal and external ear normal.      Left Ear: Tympanic membrane, ear canal and external ear normal.      Nose: Nose normal.      Mouth/Throat:      Mouth: Mucous membranes are moist.      Pharynx: Oropharynx is clear.   Eyes:      Extraocular Movements: Extraocular movements intact.      Conjunctiva/sclera: Conjunctivae normal.      Pupils: Pupils are equal, round, and reactive to light.   Cardiovascular:      Rate and Rhythm: Normal rate and regular rhythm.      Pulses: Normal pulses.      Heart sounds: Normal heart sounds.   Pulmonary:      Effort: Pulmonary effort is normal.      Breath sounds: Normal breath sounds.   Abdominal:      General: Abdomen is flat. Bowel sounds are normal.      Palpations: Abdomen is soft.   Musculoskeletal:         General: Normal range of motion.      Cervical back: Normal range of motion and neck supple.   Skin:     General: Skin is warm and dry.   Neurological:      General: No focal deficit present.      Mental Status: She is alert. Mental status is at baseline. She is disoriented.   Psychiatric:         Mood and Affect: Mood normal.         Behavior: Behavior normal.         Thought Content: " Thought content normal.         Judgment: Judgment normal.           Last Labs:     Office Visit on 12/12/2023   Component Date Value    WBC 12/12/2023 6.70     RBC 12/12/2023 2.95 (L)     Hemoglobin 12/12/2023 9.2 (L)     Hematocrit 12/12/2023 31.8 (L)     MCV 12/12/2023 107.8 (H)     MCH 12/12/2023 31.2 (H)     MCHC 12/12/2023 28.9 (L)     RDW 12/12/2023 19.3 (H)     Platelet Count 12/12/2023 169     MPV 12/12/2023 10.7     Neutrophils % 12/12/2023 79.6 (H)     Lymphocytes % 12/12/2023 8.1 (L)     Monocytes % 12/12/2023 11.5 (H)     Eosinophils % 12/12/2023 0.0 (L)     Basophils % 12/12/2023 0.4     Immature Granulocytes % 12/12/2023 0.4     nRBC, Auto 12/12/2023 0.0     Neutrophils, Abs 12/12/2023 5.33     Lymphocytes, Absolute 12/12/2023 0.54 (L)     Monocytes, Absolute 12/12/2023 0.77     Eosinophils, Absolute 12/12/2023 0.00     Basophils, Absolute 12/12/2023 0.03     Immature Granulocytes, A* 12/12/2023 0.03     nRBC, Absolute 12/12/2023 0.00     Diff Type 12/12/2023 Scan Smear     Platelet Morphology 12/12/2023 Large & Giant Platelets (A)     Anisocytosis 12/12/2023 2+     Macrocytosis 12/12/2023 2+     Ovalocytes 12/12/2023 Few     Polychromasia 12/12/2023 Few     [place]/Echinocytes 12/12/2023 Few        Last Imaging:  X-Ray Chest AP Portable  Narrative: EXAMINATION:  XR CHEST AP PORTABLE    CLINICAL HISTORY:  Chest Pain;    COMPARISON:  6 October 2021    TECHNIQUE:  XR CHEST AP PORTABLE    FINDINGS:  The heart and mediastinum are stable in size and configuration.  Right IJ Port-A-Cath is unchanged.  The pulmonary vascularity is normal in caliber.  No lung infiltrates, effusions, pneumothorax or other abnormality is demonstrated.  Impression: No acute cardiopulmonary findings.    Electronically signed by: Mansoor Lopez  Date:    07/20/2023  Time:    17:22         **Labs and x-rays personally reviewed by me    ** reviewed      Objective:        Assessment:       1. Anemia, unspecified type  CBC Auto  Differential    CBC Auto Differential    CBC Morphology      2. S/P MVR (mitral valve repair)  Ambulatory referral/consult to Cardiology      3. Diverticulitis  Ambulatory referral/consult to Gastroenterology      4. Malignant neoplasm of lung, unspecified laterality, unspecified part of lung        5. Diverticulosis             Plan:         1. Anemia, unspecified type  -     CBC Auto Differential; Future; Expected date: 12/12/2023  -     CBC Morphology    2. S/P MVR (mitral valve repair)  -     Ambulatory referral/consult to Cardiology; Future; Expected date: 12/19/2023    3. Diverticulitis  -     Ambulatory referral/consult to Gastroenterology; Future; Expected date: 12/19/2023    4. Malignant neoplasm of lung, unspecified laterality, unspecified part of lung    5. Diverticulosis    Other orders  -     silver sulfADIAZINE 1% (SILVADENE) 1 % cream; Apply topically 2 (two) times daily.  Dispense: 85 g; Refill: 3

## 2024-01-11 ENCOUNTER — DOCUMENT SCAN (OUTPATIENT)
Dept: HOME HEALTH SERVICES | Facility: HOSPITAL | Age: 64
End: 2024-01-11
Payer: MEDICAID

## 2024-02-19 ENCOUNTER — OFFICE VISIT (OUTPATIENT)
Dept: GASTROENTEROLOGY | Facility: CLINIC | Age: 64
End: 2024-02-19
Payer: MEDICAID

## 2024-02-19 ENCOUNTER — OFFICE VISIT (OUTPATIENT)
Dept: CARDIOLOGY | Facility: CLINIC | Age: 64
End: 2024-02-19
Payer: MEDICAID

## 2024-02-19 VITALS
SYSTOLIC BLOOD PRESSURE: 102 MMHG | OXYGEN SATURATION: 98 % | HEART RATE: 94 BPM | BODY MASS INDEX: 27.26 KG/M2 | DIASTOLIC BLOOD PRESSURE: 80 MMHG | WEIGHT: 144.38 LBS | HEIGHT: 61 IN

## 2024-02-19 VITALS
BODY MASS INDEX: 27.38 KG/M2 | HEIGHT: 61 IN | HEART RATE: 90 BPM | SYSTOLIC BLOOD PRESSURE: 120 MMHG | DIASTOLIC BLOOD PRESSURE: 76 MMHG | WEIGHT: 145 LBS

## 2024-02-19 DIAGNOSIS — K58.1 IRRITABLE BOWEL SYNDROME WITH CONSTIPATION: Primary | ICD-10-CM

## 2024-02-19 DIAGNOSIS — Z79.01 LONG TERM (CURRENT) USE OF ANTICOAGULANTS: ICD-10-CM

## 2024-02-19 DIAGNOSIS — M46.1 SACROILIITIS: Chronic | ICD-10-CM

## 2024-02-19 DIAGNOSIS — I73.9 PVD (PERIPHERAL VASCULAR DISEASE): Chronic | ICD-10-CM

## 2024-02-19 DIAGNOSIS — I50.22 CHRONIC SYSTOLIC CHF (CONGESTIVE HEART FAILURE): ICD-10-CM

## 2024-02-19 DIAGNOSIS — C34.90 MALIGNANT NEOPLASM OF LUNG, UNSPECIFIED LATERALITY, UNSPECIFIED PART OF LUNG: Chronic | ICD-10-CM

## 2024-02-19 DIAGNOSIS — E78.5 HYPERLIPIDEMIA, UNSPECIFIED HYPERLIPIDEMIA TYPE: Chronic | ICD-10-CM

## 2024-02-19 DIAGNOSIS — E11.9 TYPE 2 DIABETES MELLITUS WITHOUT COMPLICATION, WITHOUT LONG-TERM CURRENT USE OF INSULIN: Chronic | ICD-10-CM

## 2024-02-19 DIAGNOSIS — I42.0 DILATED CARDIOMYOPATHY: Primary | Chronic | ICD-10-CM

## 2024-02-19 DIAGNOSIS — Z98.890 S/P MVR (MITRAL VALVE REPAIR): Chronic | ICD-10-CM

## 2024-02-19 DIAGNOSIS — I10 HYPERTENSION, ESSENTIAL: Chronic | ICD-10-CM

## 2024-02-19 PROCEDURE — 3079F DIAST BP 80-89 MM HG: CPT | Mod: CPTII,,, | Performed by: INTERNAL MEDICINE

## 2024-02-19 PROCEDURE — 3074F SYST BP LT 130 MM HG: CPT | Mod: CPTII,,, | Performed by: INTERNAL MEDICINE

## 2024-02-19 PROCEDURE — 3008F BODY MASS INDEX DOCD: CPT | Mod: CPTII,,,

## 2024-02-19 PROCEDURE — 99213 OFFICE O/P EST LOW 20 MIN: CPT | Mod: PBBFAC

## 2024-02-19 PROCEDURE — 1160F RVW MEDS BY RX/DR IN RCRD: CPT | Mod: CPTII,,, | Performed by: INTERNAL MEDICINE

## 2024-02-19 PROCEDURE — 1160F RVW MEDS BY RX/DR IN RCRD: CPT | Mod: CPTII,,,

## 2024-02-19 PROCEDURE — 99215 OFFICE O/P EST HI 40 MIN: CPT | Mod: S$PBB,,,

## 2024-02-19 PROCEDURE — 1159F MED LIST DOCD IN RCRD: CPT | Mod: CPTII,,, | Performed by: INTERNAL MEDICINE

## 2024-02-19 PROCEDURE — 93005 ELECTROCARDIOGRAM TRACING: CPT | Mod: PBBFAC | Performed by: INTERNAL MEDICINE

## 2024-02-19 PROCEDURE — 3074F SYST BP LT 130 MM HG: CPT | Mod: CPTII,,,

## 2024-02-19 PROCEDURE — 93010 ELECTROCARDIOGRAM REPORT: CPT | Mod: S$PBB,,, | Performed by: INTERNAL MEDICINE

## 2024-02-19 PROCEDURE — 3008F BODY MASS INDEX DOCD: CPT | Mod: CPTII,,, | Performed by: INTERNAL MEDICINE

## 2024-02-19 PROCEDURE — 99214 OFFICE O/P EST MOD 30 MIN: CPT | Mod: S$PBB,,, | Performed by: INTERNAL MEDICINE

## 2024-02-19 PROCEDURE — 99213 OFFICE O/P EST LOW 20 MIN: CPT | Mod: PBBFAC,25 | Performed by: INTERNAL MEDICINE

## 2024-02-19 PROCEDURE — 3078F DIAST BP <80 MM HG: CPT | Mod: CPTII,,,

## 2024-02-19 PROCEDURE — 1159F MED LIST DOCD IN RCRD: CPT | Mod: CPTII,,,

## 2024-02-19 NOTE — PROGRESS NOTES
Gastroenterology Clinic Note    Patient ID: [id number]   Referring MD: Wander Adamson MD   Chief Complaint:   Chief Complaint   Patient presents with    Establish Care     Diagnosed with diverticulitis at [facility] (Dr Rivero) in November        History of Present Illness   Lesli Escobar is an 64 y.o. female who is referred for abdominal pain and constipation. Patient reports she was seen at [facility] and admitted IP with diverticulitis. She reports completing course of antibiotics and also having colonoscopy performed during admission. This was Nov 2023. She reports that now, her abdominal pain has persisted and she is having constipation despite daily use of stool softener and miralax. She reports rectal bleeding at time of admission, but this has resolved. She has nausea without vomiting. Denies unintentional weight loss. She is followed by [facility] for lung cancer on chemotherapy.    Previous workup:imaging/procedures at [facility]       Review of Systems   Constitutional:  Negative for weight loss.   Gastrointestinal:  Positive for abdominal pain, constipation and nausea. Negative for blood in stool, diarrhea, heartburn, melena and vomiting.       Past Medical History      Past Medical History:   Diagnosis Date    Anxiety     Cardiomyopathy     Carotid bruit     Chronic pain syndrome     Coronary artery disease     CVA (cerebral vascular accident)     Depressive disorder     Diabetes mellitus     Hyperlipidemia     Hypertension     Lumbosacral radiculopathy     Lumbosacral spondylolysis     Rheumatoid arthritis     Sacroiliitis     Systemic lupus erythematosus        Past Surgical History     Past Surgical History:   Procedure Laterality Date    APPENDECTOMY      BLOCK, NERVE, FEMORAL Right 4/6/2022    Procedure: BLOCK, NERVE, FEMORAL;  Surgeon: Artemio Lynn MD;  Location: [facility];  Service: Pain Management;  Laterality: Right;  Right Obturator/femoral NB    BLOCK, NERVE, OBTURATOR  Right 4/6/2022    Procedure: BLOCK,NERVE,OBTURATOR;  Surgeon: Artemio Lynn MD;  Location: [facility];  Service: Pain Management;  Laterality: Right;    CARDIAC CATHETERIZATION      CHOLECYSTECTOMY      DILATION AND CURETTAGE OF UTERUS      HYSTERECTOMY      MITRAL VALVE REPLACEMENT      OOPHORECTOMY      right hip replacement Right     Right SI JI Right 12-9-2020, 12-2-2020    Dr Fisher    SELECTIVE INJECTION OF ANESTHETIC AGENT AROUND LUMBAR SPINAL NERVE ROOT BY TRANSFORAMINAL APPROACH Right 07/08/2021    Procedure: BLOCK, SPINAL NERVE ROOT, LUMBAR, SELECTIVE, TRANSFORAMINAL APPROACH, Right L3-4, L4-5 transforaminal epidural steroid injection;  Surgeon: Esperanza Fisher MD;  Location: [facility];  Service: Pain Management;  Laterality: Right;    TUBAL LIGATION         Allergies     Review of patient's allergies indicates:   Allergen Reactions    Prochlorperazine Other (See Comments)     Hallucination      Ketorolac Itching       Immunization History     Immunization History   Administered Date(s) Administered    Influenza - Quadrivalent - PF *Preferred* (6 months and older) 01/27/2020       Past Family History      Family History   Problem Relation Age of Onset    Stroke Mother     Lung cancer Mother     Stroke Father     Diabetes Sister     Hypertension Sister     Throat cancer Brother        Past Social History      Social History     Socioeconomic History    Marital status:    Tobacco Use    Smoking status: Former     Passive exposure: Current    Smokeless tobacco: Never   Substance and Sexual Activity    Alcohol use: Not Currently       Current Medications     Outpatient Medications Marked as Taking for the 2/19/24 encounter (Office Visit) with Leighann Alejandre FNP   Medication Sig Dispense Refill    albuterol (PROVENTIL) 2.5 mg /3 mL (0.083 %) nebulizer solution albuterol sulfate 2.5 mg/3 mL (0.083 %) solution for nebulization   ONE BY NEBULIZER 4 TIMES A DAY AS NEEDED SHORTNESS  OF BREATH wheezing      albuterol (PROVENTIL/VENTOLIN HFA) 90 mcg/actuation inhaler Ventolin HFA 90 mcg/actuation aerosol inhaler   Inhale 2 puffs every 4 hours by inhalation route as needed.      albuterol-ipratropium (DUO-NEB) 2.5 mg-0.5 mg/3 mL nebulizer solution 3 mLs.      ALPRAZolam (XANAX) 1 MG tablet TAKE 1 TABLET BY MOUTH ONCE DAILY AS NEEDED FOR ANXIETY AVOID ALCOHOL CAUSES DROWSINESS      amitriptyline (ELAVIL) 75 MG tablet Take 1 tablet (75 mg total) by mouth every evening. 90 tablet 3    ANTIFUNGAL, CLOTRIMAZOLE, 1 % cream SMARTSIG:Topical Morning-Evening      aspirin (ECOTRIN) 81 MG EC tablet Take 81 mg by mouth.      atorvastatin (LIPITOR) 80 MG tablet Take 80 mg by mouth every evening.      budesonide-formoterol 160-4.5 mcg (SYMBICORT) 160-4.5 mcg/actuation HFAA budesonide-formoterol  mcg-4.5 mcg/actuation aerosol inhaler   INHALE 2 PUFFS BY MOUTH TWICE DAILY AS DIRECTED (RINSE MOUTH AFTER USE)      budesonide-formoterol 80-4.5 mcg (SYMBICORT) 80-4.5 mcg/actuation HFAA budesonide-formoterol HFA 80 mcg-4.5 mcg/actuation aerosol inhaler   INHALE 2 PUFFS BY MOUTH TWICE DAILY      carvediloL (COREG) 6.25 MG tablet       cefdinir (OMNICEF) 300 MG capsule TAKE ONE CAPSULE BY MOUTH TWICE DAILY FOR 10 DAYS      chlorhexidine (PERIDEX) 0.12 % solution SMARTSIG:Ounce(s) By Mouth Twice Daily      diltiaZEM (CARDIZEM CD) 120 MG Cp24 Take 120 mg by mouth.      ergocalciferol (ERGOCALCIFEROL) 50,000 unit Cap Take 50,000 Units by mouth every 7 days.      ferrous sulfate (FEOSOL) 325 mg (65 mg iron) Tab tablet ferrous sulfate 325 mg (65 mg iron) tablet   Take 1 tablet twice a day by oral route for 30 days.      fluticasone propionate (FLONASE) 50 mcg/actuation nasal spray INHALE 1 SPRAY IN EACH NOSTRIL ONCE DAILY FOR 30 DAYS.. (SHAKE BEFORE USE)      folic acid (FOLVITE) 1 MG tablet Take 1,000 mcg by mouth.      furosemide (LASIX) 40 MG tablet furosemide 40 mg tablet   TAKE 1 TABLET BY MOUTH ONCE DAILY FOR  "FLUID      gabapentin (NEURONTIN) 300 MG capsule Take 300 mg by mouth 3 (three) times daily.      glimepiride (AMARYL) 4 MG tablet 1 tablet with breakfast or the first main meal of the day      HYDROcodone-acetaminophen (NORCO)  mg per tablet hydrocodone 10 mg-acetaminophen 325 mg tablet   Take 1 tablet every 6 hours by oral route as needed for 7 days.      leflunomide (ARAVA) 10 MG Tab Take 10 mg by mouth.      LIDOcaine (LIDODERM) 5 % 1 patch.      lisinopriL 10 MG tablet Take 10 mg by mouth once daily.      loratadine (CLARITIN) 10 mg tablet TAKE 1 TABLET BY MOUTH ONCE DAILY FOR allergies      losartan (COZAAR) 25 MG tablet TAKE 1 TABLET BY MOUTH AT BEDTIME FOR HIGH BLOOD PRESSURE      MAG 64 64 mg TbEC Take 128 mg by mouth.      metFORMIN (GLUCOPHAGE) 500 MG tablet Take 500 mg by mouth 2 (two) times daily with meals.      methylPREDNISolone (MEDROL DOSEPACK) 4 mg tablet Take by mouth.      metoprolol succinate (TOPROL-XL) 25 MG 24 hr tablet TAKE 1 TABLETS BY MOUTH ONCE DAILY FOR BLOOD PRESSURE      ONETOUCH DELICA PLUS LANCET 33 gauge Misc       ONETOUCH VERIO REFLECT METER [facility]       ONETOUCH VERIO TEST STRIPS Strp       oxyCODONE-acetaminophen (PERCOCET)  mg per tablet 1 tablet as needed      oxyCODONE-acetaminophen (PERCOCET)  mg per tablet Take 1 tablet by mouth 4 (four) times daily as needed.      predniSONE (DELTASONE) 10 MG tablet prednisone 10 mg tablet   3 TABS EVERY A.M. X3DAYS THEN 2 TABS EVERY A.M. X3DAYS THEN 1 TAB EVERY MORNING X3DAY THEN 1 TAB EVERY MORNING X3DAY THEN STOP "TAKE WITH FOOD"      predniSONE (DELTASONE) 2.5 MG tablet 1/2 tab      predniSONE (DELTASONE) 5 MG tablet Take 5 mg by mouth.      rosuvastatin (CRESTOR) 5 MG tablet Crestor 5 mg tablet   Take 1 tablet every day by oral route at bedtime for 30 days.      silver sulfADIAZINE 1% (SILVADENE) 1 % cream Apply topically 2 (two) times daily. 85 g 3    sofosbuvir-velpatasvir 400-100 mg Tab sofosbuvir 400 " "mg-velpatasvir 100 mg tablet   TAKE ONE TABLET BY MOUTH DAILY      tiZANidine (ZANAFLEX) 4 MG tablet 1 tablet as needed      triamcinolone acetonide 0.1% (KENALOG) 0.1 % ointment SMARTSIG:sparingly Topical Twice Daily      warfarin (COUMADIN) 7.5 MG tablet warfarin 7.5 mg tablet   TAKE 1 TABLET BY MOUTH EVERY NIGHT AT BEDTIME          I have reviewed the current medications, allergies, vital signs, past medical and surgical history, family medical history, and social history for this encounter and agree with all findings.    OBJECTIVE    Physical Exam    /76   Pulse 90   Ht 5' 1" (1.549 m)   Wt 65.8 kg (145 lb)   BMI 27.40 kg/m²   GEN: Well appearing, cooperative, NAD  NECK: Supple, no LAD  CV: Normal rate  RESP: Unlabored  ABD: ND, no guarding  EXT: No clubbing, cyanosis, or edema  SKIN: Warm and dry  NEURO: AAO x4.     LABS    CBC (with or without Differential):   Lab Results   Component Value Date    WBC 6.70 12/12/2023    HGB 9.2 (L) 12/12/2023    HCT 31.8 (L) 12/12/2023    .8 (H) 12/12/2023    MCH 31.2 (H) 12/12/2023    MCHC 28.9 (L) 12/12/2023    RDW 19.3 (H) 12/12/2023     12/12/2023    MPV 10.7 12/12/2023    NEUTOPHILPCT 79.6 (H) 12/12/2023    DIFFTYPE Scan Smear 12/12/2023     BMP/CMP:   Lab Results   Component Value Date     07/20/2023     09/08/2020    K 4.0 07/20/2023    K 4.8 09/08/2020     07/20/2023     09/08/2020    CO2 35 (H) 07/20/2023    CO2 25 09/08/2020    BUN 17 07/20/2023    BUN 10 09/08/2020    CREATININE 0.93 07/20/2023    CREATININE 0.71 09/08/2020     07/20/2023    CALCIUM 9.1 07/20/2023    CALCIUM 9.1 09/08/2020    ALBUMIN 3.4 (L) 07/20/2023    ALBUMIN 3.1 (L) 09/08/2020    PHOSPHORUS 3.1 09/08/2020    AST 16 07/20/2023    ALT 16 07/20/2023    ALKPHOS 179 (H) 07/20/2023    MG 1.7 10/06/2021        IMAGING  Records not available at present.     ASSESSMENT  Lesli FLACO Escobar is a 64 y.o. AAF with history of DMT2, RA, SLE, asthma, heart " disease, diverticulitis, and lung cancer who is referred to clinic for follow-up.    1. Irritable bowel syndrome with constipation           PLAN    - Will start Linzess 290 mcg daily for IBS-C  - YULI for records from recent admission to [facility] [place]     There are no Patient Instructions on file for this visit.      No orders of the defined types were placed in this encounter.        The risks and benefits of my recommendations, as well as other treatment options were discussed with the patient today. All questions were answered.    40 minutes of total time spent on the encounter, which includes face to face time and non-face to face time preparing to see the patient (eg, review of tests), obtaining and/or reviewing separately obtained history, documenting clinical information in the electronic or other health record, Independently interpreting results (not separately reported) and communicating results to the patient/family/caregiver, or care coordination (not separately reported).        Leighann Alejandre, FNP/ACNP  [facility]

## 2024-02-20 ENCOUNTER — TELEPHONE (OUTPATIENT)
Dept: GASTROENTEROLOGY | Facility: CLINIC | Age: 64
End: 2024-02-20
Payer: MEDICAID

## 2024-02-20 LAB
OHS QRS DURATION: 110 MS
OHS QTC CALCULATION: 475 MS

## 2024-02-20 RX ORDER — CARVEDILOL 12.5 MG/1
12.5 TABLET ORAL 2 TIMES DAILY WITH MEALS
Qty: 60 TABLET | Refills: 11 | Status: SHIPPED | OUTPATIENT
Start: 2024-02-20 | End: 2024-03-07 | Stop reason: DRUGHIGH

## 2024-02-28 NOTE — PROGRESS NOTES
PCP: Wander Adamson MD    Referring Provider:     Subjective:   Lesli Escobar is a 64 y.o. female with hx of HTN, HLD, sacroiliitis, NIDDM, PVC, cardiomyopathy, and MR s/p MVR-[facility] who presents for cardiac follow up.       Fhx:  Family History   Problem Relation Age of Onset    Stroke Mother     Lung cancer Mother     Stroke Father     Diabetes Sister     Hypertension Sister     Throat cancer Brother      Shx:   Social History     Socioeconomic History    Marital status:    Tobacco Use    Smoking status: Former     Passive exposure: Current    Smokeless tobacco: Never   Substance and Sexual Activity    Alcohol use: Not Currently       EKG   2/19/24--NSR, LVH with repolarization abn, 94 bpm  4/9/18--sinus bradycardia, 55bpm, ST and T wave abnormality    ECHO   12/19/19  · Severely (25-30%) decreased ejection fraction. The left ventricular wall  motion is abnormal.  · Right ventricle ejection fraction is moderately reduced. TAPSE: 0.9  · Left Atrium: Cavity is moderately dilated. Volume is moderately  increased.  · Mildly elevated central venous pressure (5-10 mm Hg).  · Mechanical mitral valve prosthesis. Mild mitral regurgitation  · Ere is a mechanical mitral valve prosthesis. 6 mm HG mean gradient with  HR 95 BPM Mild mitral regurgitation.  · Rest PAP (mmHG): 40. Mild tricuspid regurgitation     [facility]   5-1-09 - Normal coronary arteries.  S/P [facility] MVR from 2002.    3- - No LV gfram.  Normal coronary arteries with vasospasm.  Vasodilated with IC  NTG.    6/02--MVR, [facility]      Lab Results   Component Value Date     07/20/2023    K 4.0 07/20/2023     07/20/2023    CO2 35 (H) 07/20/2023    BUN 17 07/20/2023    CREATININE 0.93 07/20/2023    CALCIUM 9.1 07/20/2023    ANIONGAP 6 (L) 07/20/2023    ESTGFRAFRICA 76 10/06/2021    EGFRNONAA 99 01/29/2021       Lab Results   Component Value Date    CHOL 151 02/06/2020     Lab Results   Component Value Date    HDL 47 (L) 02/06/2020  "    Lab Results   Component Value Date    LDLCALC 89 02/06/2020     Lab Results   Component Value Date    TRIG 75 02/06/2020     No results found for: "CHOLHDL"    Lab Results   Component Value Date    WBC 6.70 12/12/2023    HGB 9.2 (L) 12/12/2023    HCT 31.8 (L) 12/12/2023    .8 (H) 12/12/2023     12/12/2023           Current Outpatient Medications:     ALPRAZolam (XANAX) 1 MG tablet, TAKE 1 TABLET BY MOUTH ONCE DAILY AS NEEDED FOR ANXIETY AVOID ALCOHOL CAUSES DROWSINESS, Disp: , Rfl:     amitriptyline (ELAVIL) 75 MG tablet, Take 1 tablet (75 mg total) by mouth every evening., Disp: 90 tablet, Rfl: 3    aspirin (ECOTRIN) 81 MG EC tablet, Take 81 mg by mouth., Disp: , Rfl:     atorvastatin (LIPITOR) 80 MG tablet, Take 80 mg by mouth every evening., Disp: , Rfl:     budesonide-formoterol 160-4.5 mcg (SYMBICORT) 160-4.5 mcg/actuation HFAA, budesonide-formoterol  mcg-4.5 mcg/actuation aerosol inhaler  INHALE 2 PUFFS BY MOUTH TWICE DAILY AS DIRECTED (RINSE MOUTH AFTER USE), Disp: , Rfl:     budesonide-formoterol 80-4.5 mcg (SYMBICORT) 80-4.5 mcg/actuation HFAA, budesonide-formoterol HFA 80 mcg-4.5 mcg/actuation aerosol inhaler  INHALE 2 PUFFS BY MOUTH TWICE DAILY, Disp: , Rfl:     diltiaZEM (CARDIZEM CD) 120 MG Cp24, Take 120 mg by mouth., Disp: , Rfl:     ergocalciferol (ERGOCALCIFEROL) 50,000 unit Cap, Take 50,000 Units by mouth every 7 days., Disp: , Rfl:     ferrous sulfate (FEOSOL) 325 mg (65 mg iron) Tab tablet, ferrous sulfate 325 mg (65 mg iron) tablet  Take 1 tablet twice a day by oral route for 30 days., Disp: , Rfl:     folic acid (FOLVITE) 1 MG tablet, Take 1,000 mcg by mouth., Disp: , Rfl:     gabapentin (NEURONTIN) 300 MG capsule, Take 300 mg by mouth 3 (three) times daily., Disp: , Rfl:     warfarin (COUMADIN) 10 MG tablet, warfarin 10 mg tablet  Take 1 tablet orally once on MON, WED, FRI., Disp: , Rfl:     albuterol (PROVENTIL) 2.5 mg /3 mL (0.083 %) nebulizer solution, albuterol " sulfate 2.5 mg/3 mL (0.083 %) solution for nebulization  ONE BY NEBULIZER 4 TIMES A DAY AS NEEDED SHORTNESS OF BREATH wheezing, Disp: , Rfl:     albuterol (PROVENTIL) 2.5 mg /3 mL (0.083 %) nebulizer solution, ONE BY NEBULIZER 4 TIMES A DAY AS NEEDED SHORTNESS OF BREATH wheezing, Disp: , Rfl:     albuterol (PROVENTIL/VENTOLIN HFA) 90 mcg/actuation inhaler, Ventolin HFA 90 mcg/actuation aerosol inhaler  Inhale 2 puffs every 4 hours by inhalation route as needed., Disp: , Rfl:     albuterol-ipratropium (DUO-NEB) 2.5 mg-0.5 mg/3 mL nebulizer solution, 3 mLs., Disp: , Rfl:     ANTIFUNGAL, CLOTRIMAZOLE, 1 % cream, SMARTSIG:Topical Morning-Evening, Disp: , Rfl:     budesonide-formoterol 80-4.5 mcg (SYMBICORT) 80-4.5 mcg/actuation HFAA, Inhale 2 puffs into the lungs 2 (two) times daily., Disp: , Rfl:     carvediloL (COREG) 12.5 MG tablet, Take 1 tablet (12.5 mg total) by mouth 2 (two) times daily with meals., Disp: 60 tablet, Rfl: 11    cefdinir (OMNICEF) 300 MG capsule, TAKE ONE CAPSULE BY MOUTH TWICE DAILY FOR 10 DAYS, Disp: , Rfl:     chlorhexidine (PERIDEX) 0.12 % solution, SMARTSIG:Ounce(s) By Mouth Twice Daily, Disp: , Rfl:     dexamethasone (DECADRON) 4 mg/mL injection, dexamethasone sodium phosphate 4 mg/mL injection solution  1 CC IM TIMES 1, Disp: , Rfl:     digoxin (LANOXIN) 125 mcg tablet, digoxin 125 mcg (0.125 mg) tablet  TAKE 2 TABLETS BY MOUTH EVERY DAY, Disp: , Rfl:     doxycycline (VIBRA-TABS) 100 MG tablet, doxycycline hyclate 100 mg tablet  Take 1 tablet twice a day by oral route for 10 days., Disp: , Rfl:     DULoxetine (CYMBALTA) 30 MG capsule, Take 1 capsule (30 mg total) by mouth once daily. (Patient not taking: Reported on 12/12/2023), Disp: 30 capsule, Rfl: 0    enoxaparin (LOVENOX) 80 mg/0.8 mL Syrg, enoxaparin 80 mg/0.8 mL subcutaneous syringe  80MG UNDER THE SKIN EVERY 12 HOURS FOR 3 DAYS, Disp: , Rfl:     fluticasone propionate (FLONASE) 50 mcg/actuation nasal spray, INHALE 1 SPRAY IN EACH  NOSTRIL ONCE DAILY FOR 30 DAYS.. (SHAKE BEFORE USE), Disp: , Rfl:     furosemide (LASIX) 40 MG tablet, furosemide 40 mg tablet  TAKE 1 TABLET BY MOUTH ONCE DAILY FOR FLUID, Disp: , Rfl:     glimepiride (AMARYL) 4 MG tablet, 1 tablet with breakfast or the first main meal of the day, Disp: , Rfl:     guaiFENesin-codeine 100-10 mg/5 ml (TUSSI-ORGANIDIN NR)  mg/5 mL syrup, codeine 10 mg-guaifenesin 100 mg/5 mL oral liquid  TAKE TWO TEASPOONSFULL (10ml) BY MOUTH 4 TIMES A DAY FOR 15 DAYS FOR COUGH AND CONGESTION .. may cause drowsiness / no alcohol / no driving, Disp: , Rfl:     hydroCHLOROthiazide (HYDRODIURIL) 12.5 MG Tab, Take 12.5 mg by mouth once daily., Disp: , Rfl:     HYDROcodone-acetaminophen (NORCO)  mg per tablet, hydrocodone 10 mg-acetaminophen 325 mg tablet  Take 1 tablet every 6 hours by oral route as needed for 7 days., Disp: , Rfl:     hydrOXYchloroQUINE (PLAQUENIL) 200 mg tablet, Take 200 mg by mouth 2 (two) times daily., Disp: , Rfl:     leflunomide (ARAVA) 10 MG Tab, Take 10 mg by mouth., Disp: , Rfl:     LIDOcaine (LIDODERM) 5 %, 1 patch., Disp: , Rfl:     linaCLOtide (LINZESS) 290 mcg Cap capsule, Take 1 capsule (290 mcg total) by mouth before breakfast., Disp: 90 capsule, Rfl: 3    lisinopriL 10 MG tablet, Take 10 mg by mouth once daily., Disp: , Rfl:     loratadine (CLARITIN) 10 mg tablet, TAKE 1 TABLET BY MOUTH ONCE DAILY FOR allergies, Disp: , Rfl:     losartan (COZAAR) 25 MG tablet, TAKE 1 TABLET BY MOUTH AT BEDTIME FOR HIGH BLOOD PRESSURE, Disp: , Rfl:     MAG 64 64 mg TbEC, Take 128 mg by mouth., Disp: , Rfl:     metFORMIN (GLUCOPHAGE) 500 MG tablet, Take 500 mg by mouth 2 (two) times daily with meals., Disp: , Rfl:     methylPREDNISolone (MEDROL DOSEPACK) 4 mg tablet, Take by mouth., Disp: , Rfl:     ONETOUCH DELICA PLUS LANCET 33 gauge Misc, , Disp: , Rfl:     ONETOUCH VERIO REFLECT METER Misc, , Disp: , Rfl:     ONETOUCH VERIO TEST STRIPS Strp, , Disp: , Rfl:      "oxyCODONE-acetaminophen (PERCOCET)  mg per tablet, 1 tablet as needed, Disp: , Rfl:     oxyCODONE-acetaminophen (PERCOCET)  mg per tablet, Take 1 tablet by mouth 4 (four) times daily as needed., Disp: , Rfl:     predniSONE (DELTASONE) 10 MG tablet, prednisone 10 mg tablet  3 TABS EVERY A.M. X3DAYS THEN 2 TABS EVERY A.M. X3DAYS THEN 1 TAB EVERY MORNING X3DAY THEN 1 TAB EVERY MORNING X3DAY THEN STOP "TAKE WITH FOOD", Disp: , Rfl:     predniSONE (DELTASONE) 2.5 MG tablet, 1/2 tab, Disp: , Rfl:     predniSONE (DELTASONE) 5 MG tablet, Take 5 mg by mouth., Disp: , Rfl:     rosuvastatin (CRESTOR) 5 MG tablet, Crestor 5 mg tablet  Take 1 tablet every day by oral route at bedtime for 30 days., Disp: , Rfl:     silver sulfADIAZINE 1% (SILVADENE) 1 % cream, Apply topically 2 (two) times daily., Disp: 85 g, Rfl: 3    sofosbuvir-velpatasvir 400-100 mg Tab, sofosbuvir 400 mg-velpatasvir 100 mg tablet  TAKE ONE TABLET BY MOUTH DAILY, Disp: , Rfl:     tiZANidine (ZANAFLEX) 4 MG tablet, 1 tablet as needed, Disp: , Rfl:     triamcinolone acetonide 0.1% (KENALOG) 0.1 % ointment, SMARTSIG:sparingly Topical Twice Daily, Disp: , Rfl:     warfarin (COUMADIN) 7.5 MG tablet, warfarin 7.5 mg tablet  TAKE 1 TABLET BY MOUTH EVERY NIGHT AT BEDTIME, Disp: , Rfl:   Medications reconciled by pt's list.     Review of Systems   Respiratory:  Positive for shortness of breath.    Cardiovascular:  Positive for palpitations. Negative for chest pain and leg swelling.   Neurological:  Negative for loss of consciousness.           Objective:   /80 (BP Location: Left arm, Patient Position: Sitting)   Pulse 94   Ht 5' 1" (1.549 m)   Wt 65.5 kg (144 lb 6.4 oz)   SpO2 98%   BMI 27.28 kg/m²     Physical Exam  Vitals reviewed.   Constitutional:       General: She is not in acute distress.     Appearance: Normal appearance.   HENT:      Head: Normocephalic and atraumatic.   Neck:      Vascular: No carotid bruit or JVD.   Cardiovascular:     "  Rate and Rhythm: Normal rate and regular rhythm.      Pulses: Normal pulses.           Radial pulses are 2+ on the right side and 2+ on the left side.        Dorsalis pedis pulses are 2+ on the right side and 2+ on the left side.      Heart sounds: Normal heart sounds. No murmur heard.  Pulmonary:      Effort: Pulmonary effort is normal.      Breath sounds: Normal breath sounds.   Musculoskeletal:      Right lower leg: No edema.      Left lower leg: No edema.   Skin:     General: Skin is warm and dry.   Neurological:      General: No focal deficit present.      Mental Status: She is alert.           Assessment:     1. Dilated cardiomyopathy      EF 20-25% by echo 12/19      2. S/P MVR (mitral valve repair)        3. Type 2 diabetes mellitus without complication, without long-term current use of insulin        4. Hyperlipidemia, unspecified hyperlipidemia type        5. Sacroiliitis        6. Malignant neoplasm of lung, unspecified laterality, unspecified part of lung        7. Hypertension, essential        8. PVD (peripheral vascular disease)        9. Chronic systolic CHF (congestive heart failure)  EKG 12-lead    EKG 12-lead    carvediloL (COREG) 12.5 MG tablet      10. Long term (current) use of anticoagulants  Protime-INR            Plan:   Increase Coreg to 12.5 mg bid  Continue monthly monitoring of PT/ INR  F/u in 4 months.

## 2024-02-29 PROBLEM — I73.9 PVD (PERIPHERAL VASCULAR DISEASE): Chronic | Status: ACTIVE | Noted: 2024-02-29

## 2024-02-29 PROBLEM — M46.1 SACROILIITIS: Chronic | Status: ACTIVE | Noted: 2024-02-29

## 2024-02-29 PROBLEM — I42.0 DILATED CARDIOMYOPATHY: Chronic | Status: ACTIVE | Noted: 2024-02-29

## 2024-02-29 PROBLEM — I10 HYPERTENSION, ESSENTIAL: Chronic | Status: ACTIVE | Noted: 2024-02-29

## 2024-03-02 ENCOUNTER — HOSPITAL ENCOUNTER (EMERGENCY)
Facility: HOSPITAL | Age: 64
Discharge: HOME | End: 2024-03-02
Attending: EMERGENCY MEDICINE
Payer: MEDICAID

## 2024-03-02 VITALS
OXYGEN SATURATION: 99 % | RESPIRATION RATE: 13 BRPM | BODY MASS INDEX: 27.49 KG/M2 | DIASTOLIC BLOOD PRESSURE: 75 MMHG | WEIGHT: 145.5 LBS | TEMPERATURE: 98 F | SYSTOLIC BLOOD PRESSURE: 135 MMHG | HEART RATE: 93 BPM

## 2024-03-02 DIAGNOSIS — R07.89 CHEST WALL PAIN: Primary | ICD-10-CM

## 2024-03-02 DIAGNOSIS — E83.42 HYPOMAGNESEMIA: ICD-10-CM

## 2024-03-02 DIAGNOSIS — M32.9 LUPUS: ICD-10-CM

## 2024-03-02 DIAGNOSIS — R07.9 CHEST PAIN: ICD-10-CM

## 2024-03-02 LAB
ALBUMIN SERPL BCP-MCNC: 3.3 G/DL (ref 3.5–5)
ALBUMIN/GLOB SERPL: 0.7 {RATIO}
ALP SERPL-CCNC: 118 U/L (ref 50–130)
ALT SERPL W P-5'-P-CCNC: 16 U/L (ref 13–56)
ANION GAP SERPL CALCULATED.3IONS-SCNC: 9 MMOL/L (ref 7–16)
APTT PPP: 36.3 SECONDS (ref 25.2–37.3)
AST SERPL W P-5'-P-CCNC: 24 U/L (ref 15–37)
BASOPHILS # BLD AUTO: 0.02 K/UL (ref 0–0.2)
BASOPHILS NFR BLD AUTO: 0.4 % (ref 0–1)
BILIRUB SERPL-MCNC: 0.3 MG/DL (ref ?–1.2)
BUN SERPL-MCNC: 14 MG/DL (ref 7–18)
BUN/CREAT SERPL: 17 (ref 6–20)
CALCIUM SERPL-MCNC: 8.9 MG/DL (ref 8.5–10.1)
CHLORIDE SERPL-SCNC: 108 MMOL/L (ref 98–107)
CO2 SERPL-SCNC: 28 MMOL/L (ref 21–32)
CREAT SERPL-MCNC: 0.83 MG/DL (ref 0.55–1.02)
D DIMER PPP FEU-MCNC: 0.3 ΜG/ML (ref 0–0.47)
DIFFERENTIAL METHOD BLD: ABNORMAL
EGFR (NO RACE VARIABLE) (RUSH/TITUS): 80 ML/MIN/1.73M2
EOSINOPHIL # BLD AUTO: 0.08 K/UL (ref 0–0.5)
EOSINOPHIL NFR BLD AUTO: 1.6 % (ref 1–4)
ERYTHROCYTE [DISTWIDTH] IN BLOOD BY AUTOMATED COUNT: 15.4 % (ref 11.5–14.5)
GLOBULIN SER-MCNC: 4.9 G/DL (ref 2–4)
GLUCOSE SERPL-MCNC: 96 MG/DL (ref 74–106)
HCT VFR BLD AUTO: 22.3 % (ref 38–47)
HCT VFR BLD AUTO: 24.9 % (ref 38–47)
HGB BLD-MCNC: 6.9 G/DL (ref 12–16)
HGB BLD-MCNC: 7.4 G/DL (ref 12–16)
IMM GRANULOCYTES # BLD AUTO: 0.03 K/UL (ref 0–0.04)
IMM GRANULOCYTES NFR BLD: 0.6 % (ref 0–0.4)
INR BLD: 2.7
LACTATE SERPL-SCNC: 0.7 MMOL/L (ref 0.4–2)
LYMPHOCYTES # BLD AUTO: 1.24 K/UL (ref 1–4.8)
LYMPHOCYTES NFR BLD AUTO: 24.5 % (ref 27–41)
MAGNESIUM SERPL-MCNC: 1.4 MG/DL (ref 1.7–2.3)
MCH RBC QN AUTO: 32.4 PG (ref 27–31)
MCHC RBC AUTO-ENTMCNC: 30.9 G/DL (ref 32–36)
MCV RBC AUTO: 104.7 FL (ref 80–96)
MONOCYTES # BLD AUTO: 0.46 K/UL (ref 0–0.8)
MONOCYTES NFR BLD AUTO: 9.1 % (ref 2–6)
MPC BLD CALC-MCNC: 10.8 FL (ref 9.4–12.4)
NEUTROPHILS # BLD AUTO: 3.24 K/UL (ref 1.8–7.7)
NEUTROPHILS NFR BLD AUTO: 63.8 % (ref 53–65)
NRBC # BLD AUTO: 0 X10E3/UL
NRBC, AUTO (.00): 0 %
PLATELET # BLD AUTO: 239 K/UL (ref 150–400)
POC OCCULT BLOOD STOOL: POSITIVE
POTASSIUM SERPL-SCNC: 3.7 MMOL/L (ref 3.5–5.1)
PROT SERPL-MCNC: 8.2 G/DL (ref 6.4–8.2)
PROTHROMBIN TIME: 28.1 SECONDS (ref 11.7–14.7)
RBC # BLD AUTO: 2.13 M/UL (ref 4.2–5.4)
SODIUM SERPL-SCNC: 141 MMOL/L (ref 136–145)
TROPONIN I SERPL DL<=0.01 NG/ML-MCNC: 7.6 PG/ML
WBC # BLD AUTO: 5.07 K/UL (ref 4.5–11)

## 2024-03-02 PROCEDURE — 85018 HEMOGLOBIN: CPT | Mod: 59 | Performed by: EMERGENCY MEDICINE

## 2024-03-02 PROCEDURE — 93010 ELECTROCARDIOGRAM REPORT: CPT | Mod: ,,, | Performed by: HOSPITALIST

## 2024-03-02 PROCEDURE — 63600175 PHARM REV CODE 636 W HCPCS: Performed by: EMERGENCY MEDICINE

## 2024-03-02 PROCEDURE — 96375 TX/PRO/DX INJ NEW DRUG ADDON: CPT

## 2024-03-02 PROCEDURE — 85025 COMPLETE CBC W/AUTO DIFF WBC: CPT | Performed by: REGISTERED NURSE

## 2024-03-02 PROCEDURE — 82272 OCCULT BLD FECES 1-3 TESTS: CPT

## 2024-03-02 PROCEDURE — 96366 THER/PROPH/DIAG IV INF ADDON: CPT

## 2024-03-02 PROCEDURE — 93005 ELECTROCARDIOGRAM TRACING: CPT

## 2024-03-02 PROCEDURE — 80053 COMPREHEN METABOLIC PANEL: CPT | Performed by: REGISTERED NURSE

## 2024-03-02 PROCEDURE — 85610 PROTHROMBIN TIME: CPT | Performed by: REGISTERED NURSE

## 2024-03-02 PROCEDURE — 85379 FIBRIN DEGRADATION QUANT: CPT | Performed by: EMERGENCY MEDICINE

## 2024-03-02 PROCEDURE — 83605 ASSAY OF LACTIC ACID: CPT | Performed by: REGISTERED NURSE

## 2024-03-02 PROCEDURE — 96365 THER/PROPH/DIAG IV INF INIT: CPT

## 2024-03-02 PROCEDURE — 83735 ASSAY OF MAGNESIUM: CPT | Performed by: REGISTERED NURSE

## 2024-03-02 PROCEDURE — 99285 EMERGENCY DEPT VISIT HI MDM: CPT | Mod: 25

## 2024-03-02 PROCEDURE — 84484 ASSAY OF TROPONIN QUANT: CPT | Performed by: REGISTERED NURSE

## 2024-03-02 PROCEDURE — 85730 THROMBOPLASTIN TIME PARTIAL: CPT | Performed by: EMERGENCY MEDICINE

## 2024-03-02 PROCEDURE — 99284 EMERGENCY DEPT VISIT MOD MDM: CPT | Mod: ,,, | Performed by: EMERGENCY MEDICINE

## 2024-03-02 RX ORDER — MORPHINE SULFATE 4 MG/ML
4 INJECTION, SOLUTION INTRAMUSCULAR; INTRAVENOUS
Status: COMPLETED | OUTPATIENT
Start: 2024-03-02 | End: 2024-03-02

## 2024-03-02 RX ORDER — METHOCARBAMOL 500 MG/1
TABLET, FILM COATED ORAL 3 TIMES DAILY
Qty: 30 TABLET | Refills: 0 | Status: SHIPPED | OUTPATIENT
Start: 2024-03-02 | End: 2024-03-07

## 2024-03-02 RX ORDER — MAGNESIUM SULFATE HEPTAHYDRATE 40 MG/ML
2 INJECTION, SOLUTION INTRAVENOUS
Status: COMPLETED | OUTPATIENT
Start: 2024-03-02 | End: 2024-03-02

## 2024-03-02 RX ORDER — ORPHENADRINE CITRATE 30 MG/ML
30 INJECTION INTRAMUSCULAR; INTRAVENOUS
Status: COMPLETED | OUTPATIENT
Start: 2024-03-02 | End: 2024-03-02

## 2024-03-02 RX ORDER — ONDANSETRON HYDROCHLORIDE 2 MG/ML
4 INJECTION, SOLUTION INTRAVENOUS
Status: COMPLETED | OUTPATIENT
Start: 2024-03-02 | End: 2024-03-02

## 2024-03-02 RX ADMIN — ONDANSETRON 4 MG: 2 INJECTION INTRAMUSCULAR; INTRAVENOUS at 01:03

## 2024-03-02 RX ADMIN — ORPHENADRINE CITRATE 30 MG: 60 INJECTION INTRAMUSCULAR; INTRAVENOUS at 03:03

## 2024-03-02 RX ADMIN — MAGNESIUM SULFATE HEPTAHYDRATE 2 G: 40 INJECTION, SOLUTION INTRAVENOUS at 03:03

## 2024-03-02 RX ADMIN — MORPHINE SULFATE 4 MG: 4 INJECTION, SOLUTION INTRAMUSCULAR; INTRAVENOUS at 01:03

## 2024-03-02 NOTE — ED PROVIDER NOTES
Encounter Date: 3/2/2024    SCRIBE #1 NOTE: I, Esperanza Aguilar, am scribing for, and in the presence of,  Erick Hollins MD.       History     Chief Complaint   Patient presents with    Chest Pain     C/O cp that started this morning. States that it feels like heaviness in the center of her chest.      Patient is a 63 y/o Female that presents to the ED with c/o Chest Pain. Patient states the chest pain started this morning and is very Nauseas. Patient states she is pain and has Cancer that has reappeared. Patients Mhx consists of Cancer, Mitro valve replacement, CVT, and PE. Patient has been on Chemotherapy for 4-5 months now. There are no other issues to report with this patient at this time.    The history is provided by the patient. No  was used.     Review of patient's allergies indicates:   Allergen Reactions    Prochlorperazine Other (See Comments)     Hallucination      Ketorolac Itching     Past Medical History:   Diagnosis Date    Anxiety     Cardiomyopathy     Carotid bruit     Chronic pain syndrome     Coronary artery disease     CVA (cerebral vascular accident)     Depressive disorder     Diabetes mellitus     Hyperlipidemia     Hypertension     Lumbosacral radiculopathy     Lumbosacral spondylolysis     Rheumatoid arthritis     Sacroiliitis     Systemic lupus erythematosus      Past Surgical History:   Procedure Laterality Date    APPENDECTOMY      BLOCK, NERVE, FEMORAL Right 4/6/2022    Procedure: BLOCK, NERVE, FEMORAL;  Surgeon: Artemio Lynn MD;  Location: [facility];  Service: Pain Management;  Laterality: Right;  Right Obturator/femoral NB    BLOCK, NERVE, OBTURATOR Right 4/6/2022    Procedure: BLOCK,NERVE,OBTURATOR;  Surgeon: Artemio Lynn MD;  Location: [facility];  Service: Pain Management;  Laterality: Right;    CARDIAC CATHETERIZATION      CHOLECYSTECTOMY      DILATION AND CURETTAGE OF UTERUS      HYSTERECTOMY      MITRAL VALVE REPLACEMENT       OOPHORECTOMY      right hip replacement Right     Right SI JI Right 12-9-2020, 12-2-2020    Dr Fisher    SELECTIVE INJECTION OF ANESTHETIC AGENT AROUND LUMBAR SPINAL NERVE ROOT BY TRANSFORAMINAL APPROACH Right 07/08/2021    Procedure: BLOCK, SPINAL NERVE ROOT, LUMBAR, SELECTIVE, TRANSFORAMINAL APPROACH, Right L3-4, L4-5 transforaminal epidural steroid injection;  Surgeon: Esperanza Fisher MD;  Location: [facility];  Service: Pain Management;  Laterality: Right;    TUBAL LIGATION       Family History   Problem Relation Age of Onset    Stroke Mother     Lung cancer Mother     Stroke Father     Diabetes Sister     Hypertension Sister     Throat cancer Brother      Social History     Tobacco Use    Smoking status: Former     Passive exposure: Current    Smokeless tobacco: Never   Substance Use Topics    Alcohol use: Not Currently     Review of Systems   Constitutional:  Negative for fever.   Respiratory:  Negative for chest tightness.    Cardiovascular:  Positive for chest pain.   Gastrointestinal:  Positive for nausea. Negative for anal bleeding and vomiting.       Physical Exam     Initial Vitals [03/02/24 1233]   BP Pulse Resp Temp SpO2   139/64 95 14 98.1 °F (36.7 °C) 100 %      MAP       --         Physical Exam    Nursing note and vitals reviewed.  Constitutional: She appears well-developed and well-nourished.   HENT:   Head: Normocephalic and atraumatic.   Eyes: EOM are normal. Pupils are equal, round, and reactive to light.   Neck: Neck supple. No thyromegaly present.   Normal range of motion.  Cardiovascular:  Normal rate, regular rhythm, normal heart sounds and intact distal pulses.           No murmur heard.  Pulmonary/Chest: Breath sounds normal. No respiratory distress. She has no wheezes.   Chest pain with knot on left upper section.   Abdominal: Abdomen is soft. Bowel sounds are normal. She exhibits no distension. There is no abdominal tenderness.   Musculoskeletal:         General: No  tenderness or edema. Normal range of motion.      Cervical back: Normal range of motion and neck supple.     Lymphadenopathy:     She has no cervical adenopathy.   Neurological: She is alert and oriented to person, place, and time. She has normal strength. No cranial nerve deficit or sensory deficit.   Skin: Skin is warm and dry. No rash noted.   Psychiatric: She has a normal mood and affect.         ED Course   Procedures  Labs Reviewed   COMPREHENSIVE METABOLIC PANEL - Abnormal; Notable for the following components:       Result Value    Chloride 108 (*)     Albumin 3.3 (*)     Globulin 4.9 (*)     All other components within normal limits   MAGNESIUM - Abnormal; Notable for the following components:    Magnesium 1.4 (*)     All other components within normal limits   CBC WITH DIFFERENTIAL - Abnormal; Notable for the following components:    RBC 2.13 (*)     Hemoglobin 6.9 (*)     Hematocrit 22.3 (*)     .7 (*)     MCH 32.4 (*)     MCHC 30.9 (*)     RDW 15.4 (*)     Lymphocytes % 24.5 (*)     Monocytes % 9.1 (*)     Immature Granulocytes % 0.6 (*)     All other components within normal limits   PROTIME-INR - Abnormal; Notable for the following components:    PT 28.1 (*)     All other components within normal limits   HEMOGLOBIN AND HEMATOCRIT, BLOOD - Abnormal; Notable for the following components:    Hemoglobin 7.4 (*)     Hematocrit 24.9 (*)     All other components within normal limits   TROPONIN I - Normal   LACTIC ACID, PLASMA - Normal   APTT - Normal   D DIMER, QUANTITATIVE - Normal   CBC W/ AUTO DIFFERENTIAL    Narrative:     The following orders were created for panel order CBC auto differential.  Procedure                               Abnormality         Status                     ---------                               -----------         ------                     CBC with Differential[[id number]]       Abnormal            Final result                 Please view results for these tests on the  individual orders.   EXTRA TUBES    Narrative:     The following orders were created for panel order EXTRA TUBES.  Procedure                               Abnormality         Status                     ---------                               -----------         ------                     Red Top Hold[[id number]]                                    In process                   Please view results for these tests on the individual orders.   RED TOP HOLD   POCT OCCULT BLOOD (STOOL)     EKG Readings: (Independently Interpreted)   Heart Rate: 94 bpm.   Probable atrial fibrillation with PVC(s) or aberrant ventricular conduction Left ventricular hypertrophy   Widespread ST-T abnormality may be due to the hypertrophy and/or ischemia   Abnormal ECG       Imaging Results              X-Ray Chest AP Portable (Final result)  Result time 03/02/24 13:05:55      Final result by Jhonny Hernández MD (03/02/24 13:05:55)                   Impression:      Bilateral interstitial markings may reflect chronic changes as there similar prior studies.  No focal infiltrate.      Electronically signed by: Jhonny Hernández  Date:    03/02/2024  Time:    13:05               Narrative:    EXAMINATION:  XR CHEST AP PORTABLE    CLINICAL HISTORY:  Chest pain, unspecified    TECHNIQUE:  Single frontal view of the chest was performed.    COMPARISON:  07/20/2023    FINDINGS:  Cardiac silhouette is mildly enlarged as before.  Sternotomy.  There are interstitial markings throughout the lungs similar to prior exams.  No pneumothorax.  No focal infiltrate.                                    X-Rays:   Independently Interpreted Readings:   Other Readings:  EXAMINATION:  XR CHEST AP PORTABLE     CLINICAL HISTORY:  Chest pain, unspecified     TECHNIQUE:  Single frontal view of the chest was performed.     COMPARISON:  07/20/2023     FINDINGS:  Cardiac silhouette is mildly enlarged as before.  Sternotomy.  There are interstitial markings throughout the lungs similar to  prior exams.  No pneumothorax.  No focal infiltrate.     Impression:     Bilateral interstitial markings may reflect chronic changes as there similar prior studies.  No focal infiltrate.        Electronically signed by: Jhonny Hernández  Date:                                            03/02/2024  Time:                                           13:05                  Medications   morphine injection 4 mg (4 mg Intravenous Given 3/2/24 1348)   ondansetron injection 4 mg (4 mg Intravenous Given 3/2/24 1348)   orphenadrine injection 30 mg (30 mg Intravenous Given 3/2/24 1550)   magnesium sulfate 2g in water 50mL IVPB (premix) (0 g Intravenous Stopped 3/2/24 1740)     Medical Decision Making  [facility]    Patient presents for emergent evaluation of acute chest pain that poses a threat to life and/or bodily function.    In the ED patient found to have acute chest wall pain.  Hypomagnesemia See ED course note   I ordered labs and personally reviewed them.  Labs significant for high sensitivity troponin normal unlikely to be myocardial infarction.  D-dimer negative unlikely to be pulmonary embolism.  Hemoglobin 7.4  I ordered X-rays and personally reviewed them and reviewed the radiologist interpretation.  Xray significant for chest x-ray no acute abnormality.    I ordered EKG and personally reviewed it.  EKG significant for no ST elevation.      Discharge MDM  Patient was managed in the ED with IV magnesium Norflex morphine  The response to treatment was improved.    Patient was discharged in stable condition.  Detailed return precautions discussed.     Amount and/or Complexity of Data Reviewed  Labs: ordered.  Radiology: ordered.    Risk  Prescription drug management.              Attending Attestation:           Physician Attestation for Scribe:  Physician Attestation Statement for Scribe #1: I, Erick Hollins MD, reviewed documentation, as scribed by Esperanza Aguilar in my presence, and it is both accurate and complete.              ED Course as of 03/04/24 1713   Sat Mar 02, 2024   1507 03/02/24 1308X-Ray Chest AP Portable  Performed: 03/02/24 1235  Final  Impression: Bilateral interstitial markings may reflect chronic changes as there similar prior studies. No focal infiltrate. Electronically signed by: Jhonny Hernández Date: 03/02/2024 Time: 13:05     [CM]   1710 As per AVS    Follow-up with your oncologist Tuesday as planned    Start prescription Robaxin as we discussed.  Alternatively can use your previously prescribed pain medication    Use warm heat and Tylenol as needed    Return the ER if symptoms are worsening or new symptoms develop    Follow up with your doctors to inquire about repeat blood work to monitor trends particularly with your blood counts to make sure your blood counts not dropping and do not need another blood transfusion [PK]   1710 Patient says her pain is characteristic for prior chest wall pain related to lupus.  High sensitivity troponin is normal unlikely to be myocardial infarction.  D-dimer was negative.  Not consistent with pulmonary embolism.  Repeat H&H hemoglobin 7.4 no ischemic cardiac disease will not transfused since hemoglobin more than 7.  Patient feeling much better will prescribe Robaxin.  She will follow up with her oncologist as scheduled Tuesday.  Today is Saturday.  Also voiced understanding to return the ER if symptoms worsen or new symptoms develop follow up with the primary care [PK]      ED Course User Index  [CM] Esperanza Aguilar  [PK] Erick Hollins MD                             Clinical Impression:  Final diagnoses:  [R07.9] Chest pain  [R07.89] Chest wall pain (Primary)  [M32.9] Lupus  [E83.42] Hypomagnesemia          ED Disposition Condition    Discharge Stable          ED Prescriptions       Medication Sig Dispense Start Date End Date Auth. Provider    methocarbamoL (ROBAXIN) 500 MG Tab Take 2-3 tablets (1,000-1,500 mg total) by mouth 3 (three) times daily. for 5 days 30 tablet 3/2/2024  3/7/2024 Erick Hollins MD          Follow-up Information       Follow up With Specialties Details Why Contact Info    Martínez Gottlieb MD Hematology and Oncology Go to   [address]  [address]  [facility] [address]  [phone]      [facility] - Emergency Department Emergency Medicine Go to  As needed, If symptoms worsen [address] [facility] [id number] [phone]    Wander Adamson MD Internal Medicine, Family Medicine, Hospitalist Schedule an appointment as soon as possible for a visit   [address] [facility] [address]  [phone]               Erick Hollins MD  03/04/24 1712

## 2024-03-02 NOTE — DISCHARGE INSTRUCTIONS
Follow-up with your oncologist Tuesday as planned    Start prescription Robaxin as we discussed.  Alternatively can use your previously prescribed pain medication    Use warm heat and Tylenol as needed    Return the ER if symptoms are worsening or new symptoms develop    Follow up with your doctors to inquire about repeat blood work to monitor trends particularly with your blood counts to make sure your blood counts not dropping and do not need another blood transfusion

## 2024-03-06 ENCOUNTER — CLINICAL SUPPORT (OUTPATIENT)
Dept: CARDIOLOGY | Facility: CLINIC | Age: 64
End: 2024-03-06
Payer: MEDICAID

## 2024-03-06 VITALS — SYSTOLIC BLOOD PRESSURE: 122 MMHG | HEART RATE: 88 BPM | RESPIRATION RATE: 20 BRPM | DIASTOLIC BLOOD PRESSURE: 80 MMHG

## 2024-03-06 PROCEDURE — 99212 OFFICE O/P EST SF 10 MIN: CPT | Mod: PBBFAC

## 2024-03-06 PROCEDURE — 85610 PROTHROMBIN TIME: CPT | Performed by: INTERNAL MEDICINE

## 2024-03-07 RX ORDER — CARVEDILOL 25 MG/1
25 TABLET ORAL 2 TIMES DAILY WITH MEALS
Qty: 60 TABLET | Refills: 11 | Status: SHIPPED | OUTPATIENT
Start: 2024-03-07 | End: 2025-03-07

## 2024-03-07 NOTE — TELEPHONE ENCOUNTER
Called pt with PT/ INR results.  No change in dose.  Will recheck in 4- 6 weeks.  Also notified that after review of her BP and HR, Dr. Ibrahim wants to increase her Coreg to 25 mg bid.  Pt voiced understanding.  New rx will be sent to pharmacy.

## 2024-04-01 RX ORDER — WARFARIN 10 MG/1
10 TABLET ORAL DAILY
Qty: 30 TABLET | Refills: 1 | OUTPATIENT
Start: 2024-04-01 | End: 2024-04-28

## 2024-04-09 ENCOUNTER — TELEPHONE (OUTPATIENT)
Dept: CARDIOLOGY | Facility: CLINIC | Age: 64
End: 2024-04-09
Payer: MEDICAID
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
## 2024-04-15 ENCOUNTER — TELEPHONE (OUTPATIENT)
Dept: CARDIOLOGY | Facility: CLINIC | Age: 64
End: 2024-04-15
Payer: MEDICAID

## 2024-04-15 PROCEDURE — 85610 PROTHROMBIN TIME: CPT | Performed by: INTERNAL MEDICINE

## 2024-04-15 NOTE — TELEPHONE ENCOUNTER
Notified of PT/ INR.  Instructed to restart Warfarin at 5 mg qd and recheck in 2 weeks. Pt voiced understanding.

## 2024-04-28 ENCOUNTER — HOSPITAL ENCOUNTER (EMERGENCY)
Facility: HOSPITAL | Age: 64
Discharge: HOME | End: 2024-04-28
Payer: MEDICAID

## 2024-04-28 VITALS
TEMPERATURE: 98 F | DIASTOLIC BLOOD PRESSURE: 83 MMHG | BODY MASS INDEX: 27 KG/M2 | OXYGEN SATURATION: 98 % | RESPIRATION RATE: 14 BRPM | WEIGHT: 143 LBS | HEART RATE: 58 BPM | SYSTOLIC BLOOD PRESSURE: 125 MMHG | HEIGHT: 61 IN

## 2024-04-28 DIAGNOSIS — R07.89 MUSCULOSKELETAL CHEST PAIN: Primary | ICD-10-CM

## 2024-04-28 DIAGNOSIS — E87.6 HYPOKALEMIA: ICD-10-CM

## 2024-04-28 DIAGNOSIS — R79.1 LOW INTERNATIONAL NORMALIZED RATIO (INR): ICD-10-CM

## 2024-04-28 DIAGNOSIS — R07.9 CHEST PAIN: ICD-10-CM

## 2024-04-28 LAB
ALBUMIN SERPL BCP-MCNC: 3.4 G/DL (ref 3.5–5)
ALBUMIN/GLOB SERPL: 0.7 {RATIO}
ALP SERPL-CCNC: 128 U/L (ref 50–130)
ALT SERPL W P-5'-P-CCNC: 13 U/L (ref 13–56)
ANION GAP SERPL CALCULATED.3IONS-SCNC: 7 MMOL/L (ref 7–16)
APTT PPP: 32 SECONDS (ref 25.2–37.3)
AST SERPL W P-5'-P-CCNC: 18 U/L (ref 15–37)
BASOPHILS # BLD AUTO: 0.01 K/UL (ref 0–0.2)
BASOPHILS NFR BLD AUTO: 0.3 % (ref 0–1)
BILIRUB SERPL-MCNC: 0.5 MG/DL (ref ?–1.2)
BUN SERPL-MCNC: 13 MG/DL (ref 7–18)
BUN/CREAT SERPL: 17 (ref 6–20)
CALCIUM SERPL-MCNC: 9.3 MG/DL (ref 8.5–10.1)
CHLORIDE SERPL-SCNC: 105 MMOL/L (ref 98–107)
CO2 SERPL-SCNC: 30 MMOL/L (ref 21–32)
CREAT SERPL-MCNC: 0.78 MG/DL (ref 0.55–1.02)
DIFFERENTIAL METHOD BLD: ABNORMAL
EGFR (NO RACE VARIABLE) (RUSH/TITUS): 86 ML/MIN/1.73M2
EOSINOPHIL # BLD AUTO: 0.1 K/UL (ref 0–0.5)
EOSINOPHIL NFR BLD AUTO: 3 % (ref 1–4)
ERYTHROCYTE [DISTWIDTH] IN BLOOD BY AUTOMATED COUNT: 15.4 % (ref 11.5–14.5)
GLOBULIN SER-MCNC: 4.7 G/DL (ref 2–4)
GLUCOSE SERPL-MCNC: 112 MG/DL (ref 74–106)
HCT VFR BLD AUTO: 30.4 % (ref 38–47)
HGB BLD-MCNC: 9.1 G/DL (ref 12–16)
IMM GRANULOCYTES # BLD AUTO: 0.03 K/UL (ref 0–0.04)
IMM GRANULOCYTES NFR BLD: 0.9 % (ref 0–0.4)
INR BLD: 1.5
LYMPHOCYTES # BLD AUTO: 1.13 K/UL (ref 1–4.8)
LYMPHOCYTES NFR BLD AUTO: 33.6 % (ref 27–41)
MCH RBC QN AUTO: 29.3 PG (ref 27–31)
MCHC RBC AUTO-ENTMCNC: 29.9 G/DL (ref 32–36)
MCV RBC AUTO: 97.7 FL (ref 80–96)
MONOCYTES # BLD AUTO: 0.48 K/UL (ref 0–0.8)
MONOCYTES NFR BLD AUTO: 14.3 % (ref 2–6)
MPC BLD CALC-MCNC: 11 FL (ref 9.4–12.4)
NEUTROPHILS # BLD AUTO: 1.61 K/UL (ref 1.8–7.7)
NEUTROPHILS NFR BLD AUTO: 47.9 % (ref 53–65)
NRBC # BLD AUTO: 0 X10E3/UL
NRBC, AUTO (.00): 0 %
PLATELET # BLD AUTO: 128 K/UL (ref 150–400)
POTASSIUM SERPL-SCNC: 3.4 MMOL/L (ref 3.5–5.1)
PROT SERPL-MCNC: 8.1 G/DL (ref 6.4–8.2)
PROTHROMBIN TIME: 17.9 SECONDS (ref 11.7–14.7)
RBC # BLD AUTO: 3.11 M/UL (ref 4.2–5.4)
SODIUM SERPL-SCNC: 139 MMOL/L (ref 136–145)
TROPONIN I SERPL DL<=0.01 NG/ML-MCNC: 7.8 PG/ML
WBC # BLD AUTO: 3.36 K/UL (ref 4.5–11)

## 2024-04-28 PROCEDURE — 25500020 PHARM REV CODE 255

## 2024-04-28 PROCEDURE — 93005 ELECTROCARDIOGRAM TRACING: CPT

## 2024-04-28 PROCEDURE — 99285 EMERGENCY DEPT VISIT HI MDM: CPT | Mod: 25

## 2024-04-28 PROCEDURE — 63600175 PHARM REV CODE 636 W HCPCS: Mod: JZ,JG

## 2024-04-28 PROCEDURE — 85025 COMPLETE CBC W/AUTO DIFF WBC: CPT

## 2024-04-28 PROCEDURE — 80053 COMPREHEN METABOLIC PANEL: CPT

## 2024-04-28 PROCEDURE — 63600175 PHARM REV CODE 636 W HCPCS

## 2024-04-28 PROCEDURE — 84484 ASSAY OF TROPONIN QUANT: CPT

## 2024-04-28 PROCEDURE — 93010 ELECTROCARDIOGRAM REPORT: CPT | Mod: ,,, | Performed by: INTERNAL MEDICINE

## 2024-04-28 PROCEDURE — 85730 THROMBOPLASTIN TIME PARTIAL: CPT

## 2024-04-28 PROCEDURE — 96374 THER/PROPH/DIAG INJ IV PUSH: CPT

## 2024-04-28 PROCEDURE — 25000003 PHARM REV CODE 250

## 2024-04-28 PROCEDURE — 85610 PROTHROMBIN TIME: CPT

## 2024-04-28 RX ORDER — HEPARIN 100 UNIT/ML
100 SYRINGE INTRAVENOUS ONCE
Status: COMPLETED | OUTPATIENT
Start: 2024-04-28 | End: 2024-04-28

## 2024-04-28 RX ORDER — WARFARIN 6 MG/1
6 TABLET ORAL DAILY
Qty: 30 TABLET | Refills: 0 | Status: SHIPPED | OUTPATIENT
Start: 2024-04-28 | End: 2024-05-28 | Stop reason: SDUPTHER

## 2024-04-28 RX ORDER — HEPARIN 100 UNIT/ML
SYRINGE INTRAVENOUS
Status: COMPLETED
Start: 2024-04-28 | End: 2024-04-28

## 2024-04-28 RX ORDER — NITROGLYCERIN 0.4 MG/1
0.4 TABLET SUBLINGUAL EVERY 5 MIN PRN
Status: DISCONTINUED | OUTPATIENT
Start: 2024-04-28 | End: 2024-04-28 | Stop reason: HOSPADM

## 2024-04-28 RX ORDER — HEPARIN SODIUM,PORCINE/PF 10 UNIT/ML
10 SYRINGE (ML) INTRAVENOUS ONCE
Status: DISCONTINUED | OUTPATIENT
Start: 2024-04-28 | End: 2024-04-28

## 2024-04-28 RX ORDER — MORPHINE SULFATE 2 MG/ML
2 INJECTION, SOLUTION INTRAMUSCULAR; INTRAVENOUS
Status: COMPLETED | OUTPATIENT
Start: 2024-04-28 | End: 2024-04-28

## 2024-04-28 RX ORDER — ASPIRIN 325 MG
325 TABLET ORAL DAILY
Status: DISCONTINUED | OUTPATIENT
Start: 2024-04-28 | End: 2024-04-28 | Stop reason: HOSPADM

## 2024-04-28 RX ADMIN — HEPARIN 100 UNITS: 100 SYRINGE at 06:04

## 2024-04-28 RX ADMIN — ASPIRIN 325 MG ORAL TABLET 325 MG: 325 PILL ORAL at 03:04

## 2024-04-28 RX ADMIN — MORPHINE SULFATE 2 MG: 2 INJECTION, SOLUTION INTRAMUSCULAR; INTRAVENOUS at 03:04

## 2024-04-28 RX ADMIN — IOPAMIDOL 100 ML: 755 INJECTION, SOLUTION INTRAVENOUS at 04:04

## 2024-04-28 RX ADMIN — POTASSIUM BICARBONATE 40 MEQ: 782 TABLET, EFFERVESCENT ORAL at 05:04

## 2024-04-28 NOTE — DISCHARGE INSTRUCTIONS
Follow-up with your PCP in 3 days for repeat BMP and INR  Increase your Warfarin from 5mg a day to 6mg a day  Call Cardiology in the morning and discuss your INR results.

## 2024-04-28 NOTE — ED PROVIDER NOTES
Encounter Date: 4/28/2024       History     Chief Complaint   Patient presents with    Chest Pain     Pain started in right leg and went to her chest.  Swelling noted in right leg.       Patient is a 64 year old AAF who presents to [facility] due to chest pain and RT LE swelling. The patient states that she noticed that her RT LE was swollen yesterday. As a result, she took her diuretic. Today, she started to have chest pain and the swelling in her RT LE was not resolved. The patient has lung cancer for which she is under the care of Dr. Gottlieb. She has a Mediport placed in her RT anterior chest. She states that she recently received a blood transfusion through the Mediport. The patient states that she has a history of DVTs and is currently on Warfarin. Her last INR 13 days ago was 1.25. She is supposed to follow-up with her cardiologist tomorrow (Dr. Ibrahim) for a repeat INR level. She has a PMH of dilated cardiomyopathy, CAD, S/P MVR, DMII, HLD, lung cancer, HTN, PVD, SLE, RA, and CVA. The patient also states that she has had 3 IVC filters placed.       Review of patient's allergies indicates:   Allergen Reactions    Prochlorperazine Other (See Comments)     Hallucination      Ketorolac Itching     Past Medical History:   Diagnosis Date    Anxiety     Cardiomyopathy     Carotid bruit     Chronic pain syndrome     Coronary artery disease     CVA (cerebral vascular accident)     Depressive disorder     Diabetes mellitus     Hyperlipidemia     Hypertension     Lumbosacral radiculopathy     Lumbosacral spondylolysis     Rheumatoid arthritis     Sacroiliitis     Systemic lupus erythematosus      Past Surgical History:   Procedure Laterality Date    APPENDECTOMY      BLOCK, NERVE, FEMORAL Right 4/6/2022    Procedure: BLOCK, NERVE, FEMORAL;  Surgeon: Artemio Lynn MD;  Location: [facility];  Service: Pain Management;  Laterality: Right;  Right Obturator/femoral NB    BLOCK, NERVE, OBTURATOR Right 4/6/2022     Procedure: BLOCK,NERVE,OBTURATOR;  Surgeon: Artemio Lynn MD;  Location: [facility];  Service: Pain Management;  Laterality: Right;    CARDIAC CATHETERIZATION      CHOLECYSTECTOMY      DILATION AND CURETTAGE OF UTERUS      HYSTERECTOMY      MITRAL VALVE REPLACEMENT      OOPHORECTOMY      right hip replacement Right     Right SI JI Right 12-9-2020, 12-2-2020    Dr Fisher    SELECTIVE INJECTION OF ANESTHETIC AGENT AROUND LUMBAR SPINAL NERVE ROOT BY TRANSFORAMINAL APPROACH Right 07/08/2021    Procedure: BLOCK, SPINAL NERVE ROOT, LUMBAR, SELECTIVE, TRANSFORAMINAL APPROACH, Right L3-4, L4-5 transforaminal epidural steroid injection;  Surgeon: sEperanza Fisher MD;  Location: [facility];  Service: Pain Management;  Laterality: Right;    TUBAL LIGATION       Family History   Problem Relation Name Age of Onset    Stroke Mother      Lung cancer Mother      Stroke Father      Diabetes Sister      Hypertension Sister      Throat cancer Brother       Social History     Tobacco Use    Smoking status: Former     Passive exposure: Current    Smokeless tobacco: Never   Substance Use Topics    Alcohol use: Not Currently     Review of Systems   Constitutional:  Negative for fever.   HENT:  Negative for sore throat.    Respiratory:  Negative for cough, shortness of breath and wheezing.    Cardiovascular:  Positive for chest pain and leg swelling. Negative for palpitations.   Gastrointestinal:  Negative for abdominal pain and nausea.   Genitourinary:  Negative for dysuria.   Musculoskeletal:  Negative for back pain.   Skin:  Negative for rash.   Neurological:  Negative for weakness.   Hematological:  Does not bruise/bleed easily.       Physical Exam     Initial Vitals [04/28/24 1420]   BP Pulse Resp Temp SpO2   139/70 62 16 97.8 °F (36.6 °C) 98 %      MAP       --         Physical Exam    Constitutional: She appears well-developed and well-nourished.   HENT:   Head: Normocephalic and atraumatic.   Right Ear:  External ear normal.   Left Ear: External ear normal.   Neck:   Normal range of motion.  Cardiovascular:  Normal rate and regular rhythm.           Pulmonary/Chest: Breath sounds normal.   Mediport in the right anterior chest wall     Healed sternal surgical incision    Abdominal: Abdomen is soft. Bowel sounds are normal.   Musculoskeletal:         General: Edema present.      Cervical back: Normal range of motion.      Comments: Ulnar deviation of MCP bilaterally     +1 Edema of the RT lower extremity. No edema of the LT LE      Neurological: She is alert and oriented to person, place, and time.   Skin: Skin is warm.   Psychiatric: She has a normal mood and affect. Her behavior is normal. Judgment and thought content normal.         Medical Screening Exam   See Full Note    ED Course   Procedures  Labs Reviewed   COMPREHENSIVE METABOLIC PANEL - Abnormal; Notable for the following components:       Result Value    Potassium 3.4 (*)     Glucose 112 (*)     Albumin 3.4 (*)     Globulin 4.7 (*)     All other components within normal limits   CBC WITH DIFFERENTIAL - Abnormal; Notable for the following components:    WBC 3.36 (*)     RBC 3.11 (*)     Hemoglobin 9.1 (*)     Hematocrit 30.4 (*)     MCV 97.7 (*)     MCHC 29.9 (*)     RDW 15.4 (*)     Platelet Count 128 (*)     Neutrophils % 47.9 (*)     Monocytes % 14.3 (*)     Immature Granulocytes % 0.9 (*)     Neutrophils, Abs 1.61 (*)     All other components within normal limits   PROTIME-INR - Abnormal; Notable for the following components:    PT 17.9 (*)     All other components within normal limits   TROPONIN I - Normal   APTT - Normal   CBC W/ AUTO DIFFERENTIAL    Narrative:     The following orders were created for panel order CBC Auto Differential.  Procedure                               Abnormality         Status                     ---------                               -----------         ------                     CBC with Differential[[id number]]        Abnormal            Final result                 Please view results for these tests on the individual orders.          Imaging Results              CTA Chest Non-Coronary (PE Studies) (Final result)  Result time 04/28/24 16:33:29      Final result by Mansoor Lopez II, MD (04/28/24 16:33:29)                   Impression:      No evidence of pulmonary thromboembolism or other acute process demonstrated.      Electronically signed by: Mansoor Lopez  Date:    04/28/2024  Time:    16:33               Narrative:    EXAMINATION:  CTA CHEST NON CORONARY (PE STUDIES)    CLINICAL HISTORY:  Pulmonary embolism (PE) suspected, unknown D-dimer;Wells Score - Moderate risk; Cancer patient with PMH of DVTS;    TECHNIQUE:  Axial CT imaging of the chest is performed with intravenous contrast. Contrast dose is 100 cc Isovue 370.    CT dose reduction technique used - Dose Rite and tube current modulation.    COMPARISON:  23 September 2018    FINDINGS:  No thrombus or other abnormality is identified in the pulmonary arteries or veins.  The pulmonary vessel caliber is within normal limits.  The heart, mediastinum and great vessels appear within normal limits.    Lung parenchyma shows no evidence of airspace disease or abnormal density.  No effusion or pneumothorax is present.                                       US Lower Extremity Veins Bilateral (Final result)  Result time 04/28/24 15:33:57      Final result by Mansoor Lopez II, MD (04/28/24 15:33:57)                   Impression:      No evidence of deep venous thrombosis.    Ultrasound images stored and captured.      Electronically signed by: Mansoor Lopez  Date:    04/28/2024  Time:    15:33               Narrative:    EXAMINATION:  US LOWER EXTREMITY VEINS BILATERAL    CLINICAL HISTORY:  Edema of RT LE;    TECHNIQUE:  Duplex and color flow Doppler and dynamic compression was performed of the bilateral lower extremity veins was  performed.    COMPARISON:  None.    FINDINGS:  No evidence of echogenic, non-compressible thrombus seen in the visualized veins of the extremities.  Color Doppler venous waveform pattern is within normal limits.                                       Medications   morphine injection 2 mg (2 mg Intravenous Given 4/28/24 1542)   iopamidoL (ISOVUE-370) injection 100 mL (100 mLs Intravenous Given 4/28/24 1617)   potassium bicarbonate disintegrating tablet 40 mEq (40 mEq Oral Given 4/28/24 1747)   heparin, porcine (PF) 100 unit/mL injection flush 100 Units (100 Units Intravenous Given 4/28/24 1806)     Medical Decision Making  Patient has active cancer and is complaining of chest/pain and SOB. Patient has a history of DVT and is on warfarin, but subtherapeutic level noted. Concern for DVT of RT and PE.   Differential diagnosis: Chest pain due to NSTEMI/STEMI vs PE   US of RT LE is negative for DVT   CT PE is negative   Troponin WNL   K 3.4 (Give K)  INR 1.50 (Will increase warfarin dose from 5mg to 6mg QD)    Patient to call cardiology in the morning and make them aware of the INR and increase in her warfarin dose. Per note, will follow-up in 2 weeks for repeat INR.     Amount and/or Complexity of Data Reviewed  Labs: ordered.  Radiology: ordered.    Risk  OTC drugs.  Prescription drug management.               ED Course as of 05/05/24 1606   Sun Apr 28, 2024   1536 US demonstrated: No evidence of DVT in bilateral LE [DW]   1611 INR is 1.50    [DW]   1627 H/H is 9.1/30.4. PLT are 128. CT PE pending  [DW]   1738 CT PE is negative, no PE or other acute process. Patient is having MSK pain of the left anterior breast. Discussed using topical Bengay/Voltaren on the area as needed and buying a supportive bra. Patient's K is 3.4. Giving 40MEQ of K. Troponin was negative at 7.8. [DW]      ED Course User Index  [DW] Loli Bradley MD                           Clinical Impression:       Hypokalemia  Subtherapeutic INR    SOB  Musculoskeletal Chest Pain  Anemia          Loli Bradley MD  Resident  05/05/24 9214

## 2024-04-29 ENCOUNTER — TELEPHONE (OUTPATIENT)
Dept: CARDIOLOGY | Facility: CLINIC | Age: 64
End: 2024-04-29
Payer: MEDICAID

## 2024-04-29 PROCEDURE — 85610 PROTHROMBIN TIME: CPT | Performed by: INTERNAL MEDICINE

## 2024-04-29 NOTE — TELEPHONE ENCOUNTER
Notified of INR results. Was in ER last night and they increased her Coumadin to 6 mg qd.  She had it filled today.  Will recheck in 2 weeks.

## 2024-05-12 LAB
OHS QRS DURATION: 110 MS
OHS QTC CALCULATION: 468 MS

## 2024-05-15 ENCOUNTER — TELEPHONE (OUTPATIENT)
Dept: CARDIOLOGY | Facility: CLINIC | Age: 64
End: 2024-05-15
Payer: MEDICAID

## 2024-05-15 PROCEDURE — 85610 PROTHROMBIN TIME: CPT | Performed by: INTERNAL MEDICINE

## 2024-05-15 NOTE — TELEPHONE ENCOUNTER
Notified of INR.  Instructed to take 2 (6mg) tabs tonight.  Staring tomorrow take 1 1/2 tabs alternating QOD with one tablet.  Recheck in 2 weeks.  Pt voiced understanding and took notes.

## 2024-05-20 ENCOUNTER — OFFICE VISIT (OUTPATIENT)
Dept: GASTROENTEROLOGY | Facility: CLINIC | Age: 64
End: 2024-05-20
Payer: MEDICAID

## 2024-05-20 VITALS
HEIGHT: 61 IN | DIASTOLIC BLOOD PRESSURE: 72 MMHG | SYSTOLIC BLOOD PRESSURE: 118 MMHG | BODY MASS INDEX: 27 KG/M2 | WEIGHT: 143 LBS | HEART RATE: 53 BPM

## 2024-05-20 DIAGNOSIS — K58.1 IRRITABLE BOWEL SYNDROME WITH CONSTIPATION: Primary | ICD-10-CM

## 2024-05-20 DIAGNOSIS — D64.9 ANEMIA, UNSPECIFIED TYPE: ICD-10-CM

## 2024-05-20 PROCEDURE — 99214 OFFICE O/P EST MOD 30 MIN: CPT | Mod: S$PBB,,,

## 2024-05-20 PROCEDURE — 1159F MED LIST DOCD IN RCRD: CPT | Mod: CPTII,,,

## 2024-05-20 PROCEDURE — 99215 OFFICE O/P EST HI 40 MIN: CPT | Mod: PBBFAC

## 2024-05-20 PROCEDURE — 3078F DIAST BP <80 MM HG: CPT | Mod: CPTII,,,

## 2024-05-20 PROCEDURE — 3008F BODY MASS INDEX DOCD: CPT | Mod: CPTII,,,

## 2024-05-20 PROCEDURE — 3074F SYST BP LT 130 MM HG: CPT | Mod: CPTII,,,

## 2024-05-20 NOTE — PROGRESS NOTES
Gastroenterology Clinic Note    Patient ID: [id number]   Referring MD: No ref. provider found   Chief Complaint:   Chief Complaint   Patient presents with    Follow-up    Abdominal Pain       History of Present Illness   Lesli Escobar is an 64 y.o. female who is referred for abdominal pain and constipation. Patient reports she was seen at [facility] and admitted IP with diverticulitis. She reports completing course of antibiotics and also having colonoscopy performed during admission. This was Nov 2023. She reports that now, her abdominal pain has persisted and she is having constipation despite daily use of stool softener and miralax. She reports rectal bleeding at time of admission, but this has resolved. She has nausea without vomiting. Denies unintentional weight loss. She is followed by [facility] for lung cancer on chemotherapy.    Previous workup:imaging/procedures at [place] [facility]     Interval  - constipation and abdominal discomfort has improved since starting Linzess  - she reports that recent visit with Oncology revealed persistent anemia  - records received from [facility] regarding admission November 2023; during admission patient received a total of 8 units of PRBCs; she underwent EGD on 11/20/2023 which showed small hiatal hernia, normal stomach and duodenum; colonoscopy on 11/21/2023 revealed multiple small and large mouth diverticula found in the entire colon, also blood/coffee ground like material found in the entire colon; no active bleeding was identified; she did have a small bowel follow-through      Review of Systems   Constitutional:  Negative for weight loss.   Gastrointestinal:  Positive for abdominal pain, constipation and nausea. Negative for blood in stool, diarrhea, heartburn, melena and vomiting.       Past Medical History      Past Medical History:   Diagnosis Date    Anxiety     Cardiomyopathy     Carotid bruit     Chronic pain syndrome     Coronary artery disease     CVA (cerebral  vascular accident)     Depressive disorder     Diabetes mellitus     Hyperlipidemia     Hypertension     Lumbosacral radiculopathy     Lumbosacral spondylolysis     Rheumatoid arthritis     Sacroiliitis     Systemic lupus erythematosus        Past Surgical History     Past Surgical History:   Procedure Laterality Date    APPENDECTOMY      BLOCK, NERVE, FEMORAL Right 4/6/2022    Procedure: BLOCK, NERVE, FEMORAL;  Surgeon: Artemio Lynn MD;  Location: [facility];  Service: Pain Management;  Laterality: Right;  Right Obturator/femoral NB    BLOCK, NERVE, OBTURATOR Right 4/6/2022    Procedure: BLOCK,NERVE,OBTURATOR;  Surgeon: Artemio Lynn MD;  Location: [facility];  Service: Pain Management;  Laterality: Right;    CARDIAC CATHETERIZATION      CHOLECYSTECTOMY      DILATION AND CURETTAGE OF UTERUS      HYSTERECTOMY      MITRAL VALVE REPLACEMENT      OOPHORECTOMY      right hip replacement Right     Right SI JI Right 12-9-2020, 12-2-2020    Dr Fisher    SELECTIVE INJECTION OF ANESTHETIC AGENT AROUND LUMBAR SPINAL NERVE ROOT BY TRANSFORAMINAL APPROACH Right 07/08/2021    Procedure: BLOCK, SPINAL NERVE ROOT, LUMBAR, SELECTIVE, TRANSFORAMINAL APPROACH, Right L3-4, L4-5 transforaminal epidural steroid injection;  Surgeon: Esperanza Fisher MD;  Location: [facility];  Service: Pain Management;  Laterality: Right;    TUBAL LIGATION         Allergies     Review of patient's allergies indicates:   Allergen Reactions    Prochlorperazine Other (See Comments)     Hallucination      Ketorolac Itching       Immunization History     Immunization History   Administered Date(s) Administered    Influenza - Quadrivalent - PF *Preferred* (6 months and older) 01/27/2020       Past Family History      Family History   Problem Relation Name Age of Onset    Stroke Mother      Lung cancer Mother      Stroke Father      Diabetes Sister      Hypertension Sister      Throat cancer Brother         Past Social  History      Social History     Socioeconomic History    Marital status:    Tobacco Use    Smoking status: Former     Passive exposure: Current    Smokeless tobacco: Never   Substance and Sexual Activity    Alcohol use: Not Currently       Current Medications     Outpatient Medications Marked as Taking for the 5/20/24 encounter (Office Visit) with Leighann Alejandre FNP   Medication Sig Dispense Refill    albuterol (PROVENTIL) 2.5 mg /3 mL (0.083 %) nebulizer solution albuterol sulfate 2.5 mg/3 mL (0.083 %) solution for nebulization   ONE BY NEBULIZER 4 TIMES A DAY AS NEEDED SHORTNESS OF BREATH wheezing      albuterol (PROVENTIL/VENTOLIN HFA) 90 mcg/actuation inhaler Ventolin HFA 90 mcg/actuation aerosol inhaler   Inhale 2 puffs every 4 hours by inhalation route as needed.      albuterol-ipratropium (DUO-NEB) 2.5 mg-0.5 mg/3 mL nebulizer solution 3 mLs.      ALPRAZolam (XANAX) 1 MG tablet TAKE 1 TABLET BY MOUTH ONCE DAILY AS NEEDED FOR ANXIETY AVOID ALCOHOL CAUSES DROWSINESS      amitriptyline (ELAVIL) 75 MG tablet Take 1 tablet (75 mg total) by mouth every evening. 90 tablet 3    atorvastatin (LIPITOR) 80 MG tablet Take 80 mg by mouth every evening.      budesonide-formoterol 160-4.5 mcg (SYMBICORT) 160-4.5 mcg/actuation HFAA budesonide-formoterol  mcg-4.5 mcg/actuation aerosol inhaler   INHALE 2 PUFFS BY MOUTH TWICE DAILY AS DIRECTED (RINSE MOUTH AFTER USE)      budesonide-formoterol 80-4.5 mcg (SYMBICORT) 80-4.5 mcg/actuation HFAA budesonide-formoterol HFA 80 mcg-4.5 mcg/actuation aerosol inhaler   INHALE 2 PUFFS BY MOUTH TWICE DAILY      diltiaZEM (CARDIZEM CD) 120 MG Cp24 Take 120 mg by mouth.      ferrous sulfate (FEOSOL) 325 mg (65 mg iron) Tab tablet ferrous sulfate 325 mg (65 mg iron) tablet   Take 1 tablet twice a day by oral route for 30 days.      folic acid (FOLVITE) 1 MG tablet Take 1,000 mcg by mouth.      furosemide (LASIX) 40 MG tablet furosemide 40 mg tablet   TAKE 1 TABLET BY MOUTH ONCE  "DAILY FOR FLUID      gabapentin (NEURONTIN) 300 MG capsule Take 300 mg by mouth 3 (three) times daily.      linaCLOtide (LINZESS) 290 mcg Cap capsule Take 1 capsule (290 mcg total) by mouth before breakfast. 90 capsule 3    MAG 64 64 mg TbEC Take 128 mg by mouth.      metFORMIN (GLUCOPHAGE) 500 MG tablet Take 500 mg by mouth 2 (two) times daily with meals.      ONETOUCH DELICA PLUS LANCET 33 gauge Misc       ONETOUCH VERIO REFLECT METER [facility]       ONETOUCH VERIO TEST STRIPS Strp       warfarin (COUMADIN) 6 MG tablet Take 1 tablet (6 mg total) by mouth Daily. 30 tablet 0        I have reviewed the current medications, allergies, vital signs, past medical and surgical history, family medical history, and social history for this encounter and agree with all findings.    OBJECTIVE    Physical Exam    /72   Pulse (!) 53   Ht 5' 1" (1.549 m)   Wt 64.9 kg (143 lb)   BMI 27.02 kg/m²   GEN: Well appearing, cooperative, NAD  NECK: Supple, no LAD  CV: Normal rate  RESP: Unlabored  ABD: ND, no guarding  EXT: No clubbing, cyanosis, or edema  SKIN: Warm and dry  NEURO: AAO x4.     LABS    CBC (with or without Differential):   Lab Results   Component Value Date    WBC 3.36 (L) 04/28/2024    HGB 9.1 (L) 04/28/2024    HCT 30.4 (L) 04/28/2024    MCV 97.7 (H) 04/28/2024    MCH 29.3 04/28/2024    MCHC 29.9 (L) 04/28/2024    RDW 15.4 (H) 04/28/2024     (L) 04/28/2024    MPV 11.0 04/28/2024    NEUTOPHILPCT 47.9 (L) 04/28/2024    DIFFTYPE Auto 04/28/2024     BMP/CMP:   Lab Results   Component Value Date     04/28/2024     09/08/2020    K 3.4 (L) 04/28/2024    K 4.8 09/08/2020     04/28/2024     09/08/2020    CO2 30 04/28/2024    CO2 25 09/08/2020    BUN 13 04/28/2024    BUN 10 09/08/2020    CREATININE 0.78 04/28/2024    CREATININE 0.71 09/08/2020     (H) 04/28/2024    CALCIUM 9.3 04/28/2024    CALCIUM 9.1 09/08/2020    ALBUMIN 3.4 (L) 04/28/2024    ALBUMIN 3.1 (L) 09/08/2020    PHOSPHORUS " 3.1 09/08/2020    AST 18 04/28/2024    ALT 13 04/28/2024    ALKPHOS 128 04/28/2024    MG 1.4 (L) 03/02/2024        IMAGING  Records not available at present.     ASSESSMENT  Lesli Escobar is a 64 y.o. AAF with history of DMT2, RA, SLE, asthma, heart disease, diverticulitis, and lung cancer who is referred to clinic for follow-up.    1. Irritable bowel syndrome with constipation    2. Anemia, unspecified type             PLAN    - continue Linzess 290 mcg daily for IBS-C  - plan for VCE for further evaluation of persistent anemia    There are no Patient Instructions on file for this visit.      No orders of the defined types were placed in this encounter.        The risks and benefits of my recommendations, as well as other treatment options were discussed with the patient today. All questions were answered.    30 minutes of total time spent on the encounter, which includes face to face time and non-face to face time preparing to see the patient (eg, review of tests), obtaining and/or reviewing separately obtained history, documenting clinical information in the electronic or other health record, Independently interpreting results (not separately reported) and communicating results to the patient/family/caregiver, or care coordination (not separately reported).        RAGHAVENDRA Carver/ACNP [facility]

## 2024-05-21 LAB
OHS QRS DURATION: 106 MS
OHS QTC CALCULATION: 458 MS

## 2024-05-29 RX ORDER — WARFARIN 6 MG/1
6 TABLET ORAL DAILY
Qty: 90 TABLET | Refills: 2 | OUTPATIENT
Start: 2024-05-29 | End: 2024-05-30 | Stop reason: SDUPTHER

## 2024-05-30 RX ORDER — WARFARIN 6 MG/1
6 TABLET ORAL DAILY
Qty: 45 TABLET | Refills: 2 | Status: SHIPPED | OUTPATIENT
Start: 2024-05-30 | End: 2025-02-24

## 2024-05-31 ENCOUNTER — HOSPITAL ENCOUNTER (OUTPATIENT)
Dept: RADIOLOGY | Facility: HOSPITAL | Age: 64
Discharge: HOME | End: 2024-05-31
Attending: INTERNAL MEDICINE
Payer: MEDICAID

## 2024-05-31 VITALS — HEIGHT: 61 IN | BODY MASS INDEX: 27.56 KG/M2 | WEIGHT: 146 LBS

## 2024-05-31 DIAGNOSIS — Z12.31 OTHER SCREENING MAMMOGRAM: ICD-10-CM

## 2024-05-31 PROCEDURE — 77067 SCR MAMMO BI INCL CAD: CPT | Mod: TC

## 2024-05-31 PROCEDURE — 77063 BREAST TOMOSYNTHESIS BI: CPT | Mod: TC

## 2024-06-04 ENCOUNTER — HOSPITAL ENCOUNTER (OUTPATIENT)
Dept: GASTROENTEROLOGY | Facility: HOSPITAL | Age: 64
Discharge: HOME | End: 2024-06-04
Payer: MEDICAID

## 2024-06-04 DIAGNOSIS — D64.9 ANEMIA, UNSPECIFIED TYPE: ICD-10-CM

## 2024-06-04 PROCEDURE — 91110 GI TRC IMG INTRAL ESOPH-ILE: CPT | Mod: TC | Performed by: INTERNAL MEDICINE

## 2024-06-04 PROCEDURE — 27201423 OPTIME MED/SURG SUP & DEVICES STERILE SUPPLY

## 2024-06-04 NOTE — H&P
Gastroenterology Pre-procedure H&P    History of Present Illness    Lesli Escobar is a 64 y.o. female that  has a past medical history of Anxiety, Cardiomyopathy, Carotid bruit, Chronic pain syndrome, Coronary artery disease, CVA (cerebral vascular accident), Depressive disorder, Diabetes mellitus, Hyperlipidemia, Hypertension, Lumbosacral radiculopathy, Lumbosacral spondylolysis, Rheumatoid arthritis, Sacroiliitis, and Systemic lupus erythematosus.     Patient with admission to [facility] for acute on chronic anemia on anticoagulation requiring 8u pRBC and bidirectional endoscopy with no etiology for blood loss. Here today for small bowel evaluation.       Past Medical History:   Diagnosis Date    Anxiety     Cardiomyopathy     Carotid bruit     Chronic pain syndrome     Coronary artery disease     CVA (cerebral vascular accident)     Depressive disorder     Diabetes mellitus     Hyperlipidemia     Hypertension     Lumbosacral radiculopathy     Lumbosacral spondylolysis     Rheumatoid arthritis     Sacroiliitis     Systemic lupus erythematosus        Past Surgical History:   Procedure Laterality Date    APPENDECTOMY      BLOCK, NERVE, FEMORAL Right 4/6/2022    Procedure: BLOCK, NERVE, FEMORAL;  Surgeon: Artemio Lynn MD;  Location: [facility];  Service: Pain Management;  Laterality: Right;  Right Obturator/femoral NB    BLOCK, NERVE, OBTURATOR Right 4/6/2022    Procedure: BLOCK,NERVE,OBTURATOR;  Surgeon: Artemio Lynn MD;  Location: [facility];  Service: Pain Management;  Laterality: Right;    CARDIAC CATHETERIZATION      CHOLECYSTECTOMY      DILATION AND CURETTAGE OF UTERUS      HYSTERECTOMY      MITRAL VALVE REPLACEMENT      OOPHORECTOMY      right hip replacement Right     Right SI JI Right 12-9-2020, 12-2-2020    Dr Fisher    SELECTIVE INJECTION OF ANESTHETIC AGENT AROUND LUMBAR SPINAL NERVE ROOT BY TRANSFORAMINAL APPROACH Right 07/08/2021    Procedure: BLOCK, SPINAL NERVE ROOT,  LUMBAR, SELECTIVE, TRANSFORAMINAL APPROACH, Right L3-4, L4-5 transforaminal epidural steroid injection;  Surgeon: Esperanza Fisher MD;  Location: [facility];  Service: Pain Management;  Laterality: Right;    TUBAL LIGATION         Family History   Problem Relation Name Age of Onset    Stroke Mother      Lung cancer Mother      Stroke Father      Diabetes Sister      Hypertension Sister      Throat cancer Brother         Social History     Socioeconomic History    Marital status:    Tobacco Use    Smoking status: Former     Passive exposure: Current    Smokeless tobacco: Never   Substance and Sexual Activity    Alcohol use: Not Currently       Current Outpatient Medications   Medication Sig Dispense Refill    albuterol (PROVENTIL) 2.5 mg /3 mL (0.083 %) nebulizer solution albuterol sulfate 2.5 mg/3 mL (0.083 %) solution for nebulization   ONE BY NEBULIZER 4 TIMES A DAY AS NEEDED SHORTNESS OF BREATH wheezing      albuterol (PROVENTIL) 2.5 mg /3 mL (0.083 %) nebulizer solution ONE BY NEBULIZER 4 TIMES A DAY AS NEEDED SHORTNESS OF BREATH wheezing (Patient not taking: Reported on 5/20/2024)      albuterol (PROVENTIL/VENTOLIN HFA) 90 mcg/actuation inhaler Ventolin HFA 90 mcg/actuation aerosol inhaler   Inhale 2 puffs every 4 hours by inhalation route as needed.      albuterol-ipratropium (DUO-NEB) 2.5 mg-0.5 mg/3 mL nebulizer solution 3 mLs.      ALPRAZolam (XANAX) 1 MG tablet TAKE 1 TABLET BY MOUTH ONCE DAILY AS NEEDED FOR ANXIETY AVOID ALCOHOL CAUSES DROWSINESS      amitriptyline (ELAVIL) 75 MG tablet Take 1 tablet (75 mg total) by mouth every evening. 90 tablet 3    ANTIFUNGAL, CLOTRIMAZOLE, 1 % cream SMARTSIG:Topical Morning-Evening (Patient not taking: Reported on 5/20/2024)      aspirin (ECOTRIN) 81 MG EC tablet Take 81 mg by mouth. (Patient not taking: Reported on 5/20/2024)      atorvastatin (LIPITOR) 80 MG tablet Take 80 mg by mouth every evening.      budesonide-formoterol 160-4.5 mcg (SYMBICORT)  160-4.5 mcg/actuation HFAA budesonide-formoterol  mcg-4.5 mcg/actuation aerosol inhaler   INHALE 2 PUFFS BY MOUTH TWICE DAILY AS DIRECTED (RINSE MOUTH AFTER USE)      budesonide-formoterol 80-4.5 mcg (SYMBICORT) 80-4.5 mcg/actuation HFAA budesonide-formoterol HFA 80 mcg-4.5 mcg/actuation aerosol inhaler   INHALE 2 PUFFS BY MOUTH TWICE DAILY      budesonide-formoterol 80-4.5 mcg (SYMBICORT) 80-4.5 mcg/actuation HFAA Inhale 2 puffs into the lungs 2 (two) times daily. (Patient not taking: Reported on 5/20/2024)      carvediloL (COREG) 25 MG tablet Take 1 tablet (25 mg total) by mouth 2 (two) times daily with meals. (Patient not taking: Reported on 5/20/2024) 60 tablet 11    cefdinir (OMNICEF) 300 MG capsule TAKE ONE CAPSULE BY MOUTH TWICE DAILY FOR 10 DAYS (Patient not taking: Reported on 5/20/2024)      chlorhexidine (PERIDEX) 0.12 % solution SMARTSIG:Ounce(s) By Mouth Twice Daily (Patient not taking: Reported on 5/20/2024)      dexamethasone (DECADRON) 4 mg/mL injection dexamethasone sodium phosphate 4 mg/mL injection solution   1 CC IM TIMES 1 (Patient not taking: Reported on 5/20/2024)      digoxin (LANOXIN) 125 mcg tablet digoxin 125 mcg (0.125 mg) tablet   TAKE 2 TABLETS BY MOUTH EVERY DAY (Patient not taking: Reported on 5/20/2024)      diltiaZEM (CARDIZEM CD) 120 MG Cp24 Take 120 mg by mouth.      doxycycline (VIBRA-TABS) 100 MG tablet doxycycline hyclate 100 mg tablet   Take 1 tablet twice a day by oral route for 10 days. (Patient not taking: Reported on 5/20/2024)      DULoxetine (CYMBALTA) 30 MG capsule Take 1 capsule (30 mg total) by mouth once daily. (Patient not taking: Reported on 12/12/2023) 30 capsule 0    ergocalciferol (ERGOCALCIFEROL) 50,000 unit Cap Take 50,000 Units by mouth every 7 days. (Patient not taking: Reported on 5/20/2024)      ferrous sulfate (FEOSOL) 325 mg (65 mg iron) Tab tablet ferrous sulfate 325 mg (65 mg iron) tablet   Take 1 tablet twice a day by oral route for 30 days.       fluticasone propionate (FLONASE) 50 mcg/actuation nasal spray INHALE 1 SPRAY IN EACH NOSTRIL ONCE DAILY FOR 30 DAYS.. (SHAKE BEFORE USE) (Patient not taking: Reported on 5/20/2024)      folic acid (FOLVITE) 1 MG tablet Take 1,000 mcg by mouth.      furosemide (LASIX) 40 MG tablet furosemide 40 mg tablet   TAKE 1 TABLET BY MOUTH ONCE DAILY FOR FLUID      gabapentin (NEURONTIN) 300 MG capsule Take 300 mg by mouth 3 (three) times daily.      glimepiride (AMARYL) 4 MG tablet 1 tablet with breakfast or the first main meal of the day (Patient not taking: Reported on 5/20/2024)      guaiFENesin-codeine 100-10 mg/5 ml (TUSSI-ORGANIDIN NR)  mg/5 mL syrup codeine 10 mg-guaifenesin 100 mg/5 mL oral liquid   TAKE TWO TEASPOONSFULL (10ml) BY MOUTH 4 TIMES A DAY FOR 15 DAYS FOR COUGH AND CONGESTION .. may cause drowsiness / no alcohol / no driving (Patient not taking: Reported on 5/20/2024)      hydroCHLOROthiazide (HYDRODIURIL) 12.5 MG Tab Take 12.5 mg by mouth once daily. (Patient not taking: Reported on 5/20/2024)      HYDROcodone-acetaminophen (NORCO)  mg per tablet hydrocodone 10 mg-acetaminophen 325 mg tablet   Take 1 tablet every 6 hours by oral route as needed for 7 days. (Patient not taking: Reported on 5/20/2024)      hydrOXYchloroQUINE (PLAQUENIL) 200 mg tablet Take 200 mg by mouth 2 (two) times daily. (Patient not taking: Reported on 5/20/2024)      leflunomide (ARAVA) 10 MG Tab Take 10 mg by mouth. (Patient not taking: Reported on 5/20/2024)      LIDOcaine (LIDODERM) 5 % 1 patch. (Patient not taking: Reported on 5/20/2024)      linaCLOtide (LINZESS) 290 mcg Cap capsule Take 1 capsule (290 mcg total) by mouth before breakfast. 90 capsule 3    lisinopriL 10 MG tablet Take 10 mg by mouth once daily. (Patient not taking: Reported on 5/20/2024)      loratadine (CLARITIN) 10 mg tablet TAKE 1 TABLET BY MOUTH ONCE DAILY FOR allergies (Patient not taking: Reported on 5/20/2024)      losartan (COZAAR) 25 MG  "tablet TAKE 1 TABLET BY MOUTH AT BEDTIME FOR HIGH BLOOD PRESSURE (Patient not taking: Reported on 5/20/2024)      MAG 64 64 mg TbEC Take 128 mg by mouth.      metFORMIN (GLUCOPHAGE) 500 MG tablet Take 500 mg by mouth 2 (two) times daily with meals.      methylPREDNISolone (MEDROL DOSEPACK) 4 mg tablet Take by mouth. (Patient not taking: Reported on 5/20/2024)      ONETOUCH DELICA PLUS LANCET 33 gauge Misc       ONETOUCH VERIO REFLECT METER [facility]       ONETOUCH VERIO TEST STRIPS Strp       oxyCODONE-acetaminophen (PERCOCET)  mg per tablet 1 tablet as needed (Patient not taking: Reported on 5/20/2024)      oxyCODONE-acetaminophen (PERCOCET)  mg per tablet Take 1 tablet by mouth 4 (four) times daily as needed. (Patient not taking: Reported on 5/20/2024)      predniSONE (DELTASONE) 10 MG tablet prednisone 10 mg tablet   3 TABS EVERY A.M. X3DAYS THEN 2 TABS EVERY A.M. X3DAYS THEN 1 TAB EVERY MORNING X3DAY THEN 1 TAB EVERY MORNING X3DAY THEN STOP "TAKE WITH FOOD" (Patient not taking: Reported on 5/20/2024)      predniSONE (DELTASONE) 2.5 MG tablet 1/2 tab (Patient not taking: Reported on 5/20/2024)      predniSONE (DELTASONE) 5 MG tablet Take 5 mg by mouth. (Patient not taking: Reported on 5/20/2024)      rosuvastatin (CRESTOR) 5 MG tablet Crestor 5 mg tablet   Take 1 tablet every day by oral route at bedtime for 30 days. (Patient not taking: Reported on 5/20/2024)      silver sulfADIAZINE 1% (SILVADENE) 1 % cream Apply topically 2 (two) times daily. (Patient not taking: Reported on 5/20/2024) 85 g 3    sofosbuvir-velpatasvir 400-100 mg Tab sofosbuvir 400 mg-velpatasvir 100 mg tablet   TAKE ONE TABLET BY MOUTH DAILY (Patient not taking: Reported on 5/20/2024)      tiZANidine (ZANAFLEX) 4 MG tablet 1 tablet as needed (Patient not taking: Reported on 5/20/2024)      triamcinolone acetonide 0.1% (KENALOG) 0.1 % ointment SMARTSIG:sparingly Topical Twice Daily (Patient not taking: Reported on 5/20/2024)      " warfarin (COUMADIN) 6 MG tablet Take 1 tablet (6 mg total) by mouth Daily. 45 tablet 2     No current facility-administered medications for this encounter.       Review of patient's allergies indicates:   Allergen Reactions    Prochlorperazine Other (See Comments)     Hallucination      Ketorolac Itching       Objective:  There were no vitals filed for this visit.     GEN: normal appearing, NAD, AAO x3  HENT: NCAT, anicteric, OP benign  CV: normal rate, regular rhythm  RESP: CTA, symmetric rise, unlabored  ABD: soft, ND, no guarding or TTP  SKIN: warm and dry  NEURO: grossly afocal    Assessment and Plan:    Proceed with:    VCE for normocytic anemia with unremarkable EGD/Colonoscopy. Has been transfused 8u RBC in the last year per records.       Rafael Hennessy MD  Gastroenterology

## 2024-06-06 ENCOUNTER — TELEPHONE (OUTPATIENT)
Dept: GASTROENTEROLOGY | Facility: CLINIC | Age: 64
End: 2024-06-06
Payer: MEDICAID

## 2024-06-06 NOTE — TELEPHONE ENCOUNTER
Called results and recommendations for SBC. Patient verbalized good understanding. Scanned results to Epic.

## 2024-08-05 ENCOUNTER — HOSPITAL ENCOUNTER (EMERGENCY)
Facility: HOSPITAL | Age: 64
Discharge: LEFT BEFORE DISCHARGE | End: 2024-08-05
Payer: MEDICAID

## 2024-08-05 VITALS
DIASTOLIC BLOOD PRESSURE: 61 MMHG | BODY MASS INDEX: 27.34 KG/M2 | HEART RATE: 51 BPM | OXYGEN SATURATION: 95 % | WEIGHT: 144.81 LBS | SYSTOLIC BLOOD PRESSURE: 139 MMHG | RESPIRATION RATE: 11 BRPM | TEMPERATURE: 98 F | HEIGHT: 61 IN

## 2024-08-05 DIAGNOSIS — R07.9 CHEST PAIN: ICD-10-CM

## 2024-08-05 PROCEDURE — 93005 ELECTROCARDIOGRAM TRACING: CPT

## 2024-08-05 PROCEDURE — 99283 EMERGENCY DEPT VISIT LOW MDM: CPT | Mod: 25

## 2024-08-05 NOTE — ED TRIAGE NOTES
Chief Complaint   Patient presents with    Chest Pain    Palpitations    Shortness of Breath     Pt presents to ED via POV with c/o palpitations, chest pain, and SOB that started last night. Pt is currently on chemo at [facility] for lung cancer. Pt sees Dr. Ibrahim for cardiology.

## 2024-08-05 NOTE — FIRST PROVIDER EVALUATION
Emergency Department TeleTriage Encounter Note      CHIEF COMPLAINT    Chief Complaint   Patient presents with    Chest Pain    Palpitations    Shortness of Breath     Pt presents to ED via POV with c/o palpitations, chest pain, and SOB that started last night. Pt is currently on chemo at [facility] for lung cancer. Pt sees Dr. Ibrahim for cardiology.       VITAL SIGNS   Initial Vitals [08/05/24 1752]   BP Pulse Resp Temp SpO2   (!) 155/60 (!) 54 15 98 °F (36.7 °C) 100 %      MAP       --            ALLERGIES    Review of patient's allergies indicates:   Allergen Reactions    Prochlorperazine Other (See Comments)     Hallucination      Ketorolac Itching       PROVIDER TRIAGE NOTE  This is a teletriage evaluation of a 64 y.o. female presenting to the ED complaining of chest pain. Patient complaining of chest pain and shortness of breath since this afternoon. Her heart rate has been fluttering as well.    Patient is alert and oriented. She speaks in complete sentences. She is sitting upright in the chair in no distress.     Initial orders will be placed and care will be transferred to an alternate provider when patient is roomed for a full evaluation. Any additional orders and the final disposition will be determined by that provider.         ORDERS  Labs Reviewed   CBC W/ AUTO DIFFERENTIAL    Narrative:     The following orders were created for panel order CBC auto differential.  Procedure                               Abnormality         Status                     ---------                               -----------         ------                     CBC with Differential[[id number]]                                                        Please view results for these tests on the individual orders.   COMPREHENSIVE METABOLIC PANEL   TROPONIN I   TROPONIN I   NT-PRO NATRIURETIC PEPTIDE   CBC WITH DIFFERENTIAL       ED Orders (720h ago, onward)      Start Ordered     Status Ordering Provider    08/05/24 2200 08/05/24  1900  Troponin I #2  Once Timed         Ordered JESSICA PLUMMER.    08/05/24 1900 08/05/24 1900  Vital signs  Every 15 min         Ordered JESSICA PLUMMER G.    08/05/24 1900 08/05/24 1900  Cardiac Monitoring - Adult  Continuous        Comments: Notify Physician If:    Ordered JESSICA PLUMMER G.    08/05/24 1900 08/05/24 1900  Pulse Oximetry Continuous  Continuous         Ordered JESSICA PLUMMER G.    08/05/24 1900 08/05/24 1900  Diet NPO  Diet effective now         Ordered JESSICA PLUMMER G.    08/05/24 1900 08/05/24 1900  Saline lock IV  Once         Ordered JESSICA PLUMMER G.    08/05/24 1900 08/05/24 1900  EKG 12-lead  Once        Comments: Do not perform if previously done during this visit/ triage    Ordered JESSICA PLUMMER G.    08/05/24 1900 08/05/24 1900  CBC auto differential  STAT         Ordered JESSICA PLUMMER.    08/05/24 1900 08/05/24 1900  Comprehensive metabolic panel  STAT         Ordered JESSICA PLUMMER G.    08/05/24 1900 08/05/24 1900  Troponin I #1  STAT         Ordered JESSICA PLUMMER.    08/05/24 1900 08/05/24 1900  NT-Pro Natriuretic Peptide  STAT         Ordered JESSICA PLUMMER G.    08/05/24 1900 08/05/24 1900  X-Ray Chest AP Portable  1 time imaging         Ordered JESSICA PLUMMER.    08/05/24 1900 08/05/24 1900  CBC with Differential  PROCEDURE ONCE         Ordered JESSICA PLUMMER.              Virtual Visit Note: The provider triage portion of this emergency department evaluation and documentation was performed via [employer], a HIPAA-compliant telemedicine application, in concert with a tele-presenter in the room. A face to face patient evaluation with one of my colleagues will occur once the patient is placed in an emergency department room.      DISCLAIMER: This note was prepared with [employer] voice recognition transcription software. Garbled syntax, mangled pronouns, and other bizarre constructions may be attributed to that software system.

## 2024-08-06 ENCOUNTER — TELEPHONE (OUTPATIENT)
Dept: EMERGENCY MEDICINE | Facility: HOSPITAL | Age: 64
End: 2024-08-06
Payer: MEDICAID

## 2024-08-06 NOTE — ED NOTES
Upon entering room to obtain blood work and IV access pt had removed herself from the monitor and stated she wanted to go because she just came up here to find out if her heart rate being in the 50s was normal or not and a provider has yet to come in to see her. Informed her that the NP seen her through the tele-triage to begin her work up while waiting for the ER provider. Explained to her that we did an EKG and it did show her heart rate being low and that the lab work was to test her heart enzymes along with electrolytes so we can check over if her heart has had a traumatic event or if an imbalance could be causing it as well. Pt stated she did not want to be here all night long and if the doctor could not see her now due to being busy then she would just leave. Informed pt that she would need to sign out AMA due to refusing work up and treatment.

## 2024-09-16 RX ORDER — WARFARIN 6 MG/1
TABLET ORAL
Qty: 45 TABLET | Refills: 0 | Status: SHIPPED | OUTPATIENT
Start: 2024-09-16

## 2024-10-24 ENCOUNTER — PATIENT MESSAGE (OUTPATIENT)
Dept: GASTROENTEROLOGY | Facility: CLINIC | Age: 64
End: 2024-10-24

## 2025-02-05 ENCOUNTER — TELEPHONE (OUTPATIENT)
Dept: OBSTETRICS AND GYNECOLOGY | Facility: CLINIC | Age: 65
End: 2025-02-05
Payer: MEDICAID

## 2025-02-05 NOTE — TELEPHONE ENCOUNTER
----- Message from Med Assistant Das sent at 2/5/2025  9:59 AM CST -----  Who Called: Lesli Escobar    Caller is requesting assistance/information from provider's office.    Preferred Method of Contact: Phone Call  Patient's Preferred Phone Number on File: [phone]   Best Call Back Number, if different:  Additional Information: pt wants to reschedule appt on 2-10 to 2-11

## 2025-02-08 NOTE — DISCHARGE INSTRUCTIONS
Take your current home medications previously prescribed.  Follow up with your primary care provider in 2 days.  Call Dr. De Leon's  office to see if you can be seen sooner.  Return to the ER with new or worsening symptoms.   Walk in

## 2025-03-13 ENCOUNTER — TELEPHONE (OUTPATIENT)
Dept: OBSTETRICS AND GYNECOLOGY | Facility: CLINIC | Age: 65
End: 2025-03-13
Payer: MEDICAID

## 2025-04-10 DIAGNOSIS — N81.0 URETHROCELE: Primary | ICD-10-CM

## 2025-08-26 ENCOUNTER — HOSPITAL ENCOUNTER (EMERGENCY)
Facility: HOSPITAL | Age: 65
Discharge: HOME | End: 2025-08-26
Payer: MEDICAID

## (undated) DEVICE — GLOVE SURGICAL PROTEXIS PI SIZE 7.5

## (undated) DEVICE — Device

## (undated) DEVICE — TRAY NERVE BLOCK (KC) PMA

## (undated) DEVICE — CATH IV JELCO 22GX1 IN

## (undated) DEVICE — NEEDLE SPINAL 22GX3.5 QUINCHE (KC)

## (undated) DEVICE — KIT IV START 849

## (undated) DEVICE — CATH IV JELCO 20GX1 1/4IN

## (undated) DEVICE — SET IV SOL CONTIN-FLOW 10 DROP/ML (PRIMARY)

## (undated) DEVICE — GLOVE SURGICAL PROTEXIS PI SIZE 6.5

## (undated) DEVICE — APPLICATOR CHLORAPREP HI-LITE TINTED ORANGE 26ML

## (undated) DEVICE — TRAY NERVE BLOCK (SHB) W/DRUGS

## (undated) DEVICE — GLOVE SURGICAL PROTEXIS PI CLASSIC SIZE 6.5